# Patient Record
Sex: MALE | Race: WHITE | NOT HISPANIC OR LATINO | Employment: OTHER | URBAN - METROPOLITAN AREA
[De-identification: names, ages, dates, MRNs, and addresses within clinical notes are randomized per-mention and may not be internally consistent; named-entity substitution may affect disease eponyms.]

---

## 2021-03-04 ENCOUNTER — IMMUNIZATIONS (OUTPATIENT)
Dept: FAMILY MEDICINE CLINIC | Facility: HOSPITAL | Age: 66
End: 2021-03-04

## 2021-03-04 DIAGNOSIS — Z23 ENCOUNTER FOR IMMUNIZATION: Primary | ICD-10-CM

## 2021-03-04 PROCEDURE — 91301 SARS-COV-2 / COVID-19 MRNA VACCINE (MODERNA) 100 MCG: CPT

## 2021-03-04 PROCEDURE — 0011A SARS-COV-2 / COVID-19 MRNA VACCINE (MODERNA) 100 MCG: CPT

## 2021-04-02 ENCOUNTER — IMMUNIZATIONS (OUTPATIENT)
Dept: FAMILY MEDICINE CLINIC | Facility: HOSPITAL | Age: 66
End: 2021-04-02

## 2021-04-02 DIAGNOSIS — Z23 ENCOUNTER FOR IMMUNIZATION: Primary | ICD-10-CM

## 2021-04-02 PROCEDURE — 0012A SARS-COV-2 / COVID-19 MRNA VACCINE (MODERNA) 100 MCG: CPT

## 2021-04-02 PROCEDURE — 91301 SARS-COV-2 / COVID-19 MRNA VACCINE (MODERNA) 100 MCG: CPT

## 2021-04-10 ENCOUNTER — APPOINTMENT (OUTPATIENT)
Dept: LAB | Facility: HOSPITAL | Age: 66
End: 2021-04-10
Attending: INTERNAL MEDICINE
Payer: COMMERCIAL

## 2021-04-10 ENCOUNTER — TRANSCRIBE ORDERS (OUTPATIENT)
Dept: LAB | Facility: HOSPITAL | Age: 66
End: 2021-04-10

## 2021-04-10 DIAGNOSIS — E03.8 HYPOTHYROIDISM DUE TO FIBROUS INVASIVE THYROIDITIS: ICD-10-CM

## 2021-04-10 DIAGNOSIS — E78.2 MIXED HYPERLIPIDEMIA: ICD-10-CM

## 2021-04-10 DIAGNOSIS — G40.89 SOMATOSENSORY ATTACKS (HCC): ICD-10-CM

## 2021-04-10 DIAGNOSIS — F52.21 ERECTILE DYSFUNCTION OF NONORGANIC ORIGIN: ICD-10-CM

## 2021-04-10 DIAGNOSIS — I10 ESSENTIAL HYPERTENSION, MALIGNANT: ICD-10-CM

## 2021-04-10 DIAGNOSIS — E06.5 HYPOTHYROIDISM DUE TO FIBROUS INVASIVE THYROIDITIS: ICD-10-CM

## 2021-04-10 DIAGNOSIS — G40.89 SOMATOSENSORY ATTACKS (HCC): Primary | ICD-10-CM

## 2021-04-10 LAB
ALBUMIN SERPL BCP-MCNC: 4.5 G/DL (ref 3.4–4.8)
ALP SERPL-CCNC: 103.8 U/L (ref 10–129)
ALT SERPL W P-5'-P-CCNC: 17 U/L (ref 5–63)
ANION GAP SERPL CALCULATED.3IONS-SCNC: 7 MMOL/L (ref 4–13)
AST SERPL W P-5'-P-CCNC: 23 U/L (ref 15–41)
BASOPHILS # BLD AUTO: 0.05 THOUSANDS/ΜL (ref 0–0.1)
BASOPHILS NFR BLD AUTO: 1 % (ref 0–1)
BILIRUB SERPL-MCNC: 0.43 MG/DL (ref 0.3–1.2)
BUN SERPL-MCNC: 9 MG/DL (ref 6–20)
CALCIUM SERPL-MCNC: 10.5 MG/DL (ref 8.4–10.2)
CARBAMAZEPINE SERPL-MCNC: 6.8 UG/ML (ref 4–12)
CHLORIDE SERPL-SCNC: 99 MMOL/L (ref 96–108)
CHOLEST SERPL-MCNC: 189 MG/DL
CO2 SERPL-SCNC: 26 MMOL/L (ref 22–33)
CREAT SERPL-MCNC: 0.85 MG/DL (ref 0.5–1.2)
EOSINOPHIL # BLD AUTO: 0.15 THOUSAND/ΜL (ref 0–0.61)
EOSINOPHIL NFR BLD AUTO: 3 % (ref 0–6)
ERYTHROCYTE [DISTWIDTH] IN BLOOD BY AUTOMATED COUNT: 11.9 % (ref 11.6–15.1)
GFR SERPL CREATININE-BSD FRML MDRD: 92 ML/MIN/1.73SQ M
GLUCOSE P FAST SERPL-MCNC: 96 MG/DL (ref 70–105)
HCT VFR BLD AUTO: 43.5 % (ref 36.5–49.3)
HDLC SERPL-MCNC: 51 MG/DL
HGB BLD-MCNC: 14.6 G/DL (ref 12–17)
IMM GRANULOCYTES # BLD AUTO: 0.02 THOUSAND/UL (ref 0–0.2)
IMM GRANULOCYTES NFR BLD AUTO: 0 % (ref 0–2)
LDLC SERPL CALC-MCNC: 116 MG/DL (ref 0–100)
LYMPHOCYTES # BLD AUTO: 1.46 THOUSANDS/ΜL (ref 0.6–4.47)
LYMPHOCYTES NFR BLD AUTO: 27 % (ref 14–44)
MCH RBC QN AUTO: 30.7 PG (ref 26.8–34.3)
MCHC RBC AUTO-ENTMCNC: 33.6 G/DL (ref 31.4–37.4)
MCV RBC AUTO: 91 FL (ref 82–98)
MONOCYTES # BLD AUTO: 0.66 THOUSAND/ΜL (ref 0.17–1.22)
MONOCYTES NFR BLD AUTO: 12 % (ref 4–12)
NEUTROPHILS # BLD AUTO: 2.98 THOUSANDS/ΜL (ref 1.85–7.62)
NEUTS SEG NFR BLD AUTO: 57 % (ref 43–75)
NONHDLC SERPL-MCNC: 138 MG/DL
PLATELET # BLD AUTO: 338 THOUSANDS/UL (ref 149–390)
PMV BLD AUTO: 9.6 FL (ref 8.9–12.7)
POTASSIUM SERPL-SCNC: 4.7 MMOL/L (ref 3.5–5)
PROT SERPL-MCNC: 8.1 G/DL (ref 6.4–8.3)
PSA SERPL-MCNC: 1 NG/ML (ref 0–4)
RBC # BLD AUTO: 4.76 MILLION/UL (ref 3.88–5.62)
SODIUM SERPL-SCNC: 132 MMOL/L (ref 133–145)
TRIGL SERPL-MCNC: 111.7 MG/DL
TSH SERPL DL<=0.05 MIU/L-ACNC: 1.78 UIU/ML (ref 0.34–5.6)
WBC # BLD AUTO: 5.32 THOUSAND/UL (ref 4.31–10.16)

## 2021-04-10 PROCEDURE — 84153 ASSAY OF PSA TOTAL: CPT

## 2021-04-10 PROCEDURE — 85025 COMPLETE CBC W/AUTO DIFF WBC: CPT

## 2021-04-10 PROCEDURE — 84443 ASSAY THYROID STIM HORMONE: CPT

## 2021-04-10 PROCEDURE — 80061 LIPID PANEL: CPT

## 2021-04-10 PROCEDURE — 36415 COLL VENOUS BLD VENIPUNCTURE: CPT

## 2021-04-10 PROCEDURE — 80156 ASSAY CARBAMAZEPINE TOTAL: CPT

## 2021-04-10 PROCEDURE — 80053 COMPREHEN METABOLIC PANEL: CPT

## 2021-08-14 ENCOUNTER — APPOINTMENT (OUTPATIENT)
Dept: LAB | Facility: HOSPITAL | Age: 66
End: 2021-08-14
Attending: INTERNAL MEDICINE
Payer: COMMERCIAL

## 2021-08-14 DIAGNOSIS — G40.89 SOMATOSENSORY ATTACKS (HCC): ICD-10-CM

## 2021-08-14 DIAGNOSIS — F52.21 ERECTILE DYSFUNCTION OF NONORGANIC ORIGIN: ICD-10-CM

## 2021-08-14 DIAGNOSIS — I10 ESSENTIAL HYPERTENSION, MALIGNANT: ICD-10-CM

## 2021-08-14 DIAGNOSIS — E06.5 HYPOTHYROIDISM DUE TO FIBROUS INVASIVE THYROIDITIS: ICD-10-CM

## 2021-08-14 DIAGNOSIS — E78.2 MIXED HYPERLIPIDEMIA: ICD-10-CM

## 2021-08-14 DIAGNOSIS — E83.52 HYPERCALCEMIA: ICD-10-CM

## 2021-08-14 DIAGNOSIS — E03.8 HYPOTHYROIDISM DUE TO FIBROUS INVASIVE THYROIDITIS: ICD-10-CM

## 2021-08-14 LAB
ALBUMIN SERPL BCP-MCNC: 4.4 G/DL (ref 3.4–4.8)
ALP SERPL-CCNC: 85.1 U/L (ref 10–129)
ALT SERPL W P-5'-P-CCNC: 20 U/L (ref 5–63)
ANION GAP SERPL CALCULATED.3IONS-SCNC: 7 MMOL/L (ref 4–13)
AST SERPL W P-5'-P-CCNC: 23 U/L (ref 15–41)
BASOPHILS # BLD AUTO: 0.03 THOUSANDS/ΜL (ref 0–0.1)
BASOPHILS NFR BLD AUTO: 1 % (ref 0–1)
BILIRUB SERPL-MCNC: 0.61 MG/DL (ref 0.3–1.2)
BUN SERPL-MCNC: 11 MG/DL (ref 6–20)
CALCIUM SERPL-MCNC: 9.5 MG/DL (ref 8.4–10.2)
CHLORIDE SERPL-SCNC: 99 MMOL/L (ref 96–108)
CO2 SERPL-SCNC: 24 MMOL/L (ref 22–33)
CREAT SERPL-MCNC: 0.88 MG/DL (ref 0.5–1.2)
EOSINOPHIL # BLD AUTO: 0.13 THOUSAND/ΜL (ref 0–0.61)
EOSINOPHIL NFR BLD AUTO: 2 % (ref 0–6)
ERYTHROCYTE [DISTWIDTH] IN BLOOD BY AUTOMATED COUNT: 12.1 % (ref 11.6–15.1)
GFR SERPL CREATININE-BSD FRML MDRD: 90 ML/MIN/1.73SQ M
GLUCOSE P FAST SERPL-MCNC: 93 MG/DL (ref 70–105)
HCT VFR BLD AUTO: 42.2 % (ref 36.5–49.3)
HGB BLD-MCNC: 14.6 G/DL (ref 12–17)
IMM GRANULOCYTES # BLD AUTO: 0.03 THOUSAND/UL (ref 0–0.2)
IMM GRANULOCYTES NFR BLD AUTO: 1 % (ref 0–2)
LYMPHOCYTES # BLD AUTO: 1.55 THOUSANDS/ΜL (ref 0.6–4.47)
LYMPHOCYTES NFR BLD AUTO: 29 % (ref 14–44)
MCH RBC QN AUTO: 31.3 PG (ref 26.8–34.3)
MCHC RBC AUTO-ENTMCNC: 34.6 G/DL (ref 31.4–37.4)
MCV RBC AUTO: 91 FL (ref 82–98)
MONOCYTES # BLD AUTO: 0.66 THOUSAND/ΜL (ref 0.17–1.22)
MONOCYTES NFR BLD AUTO: 12 % (ref 4–12)
NEUTROPHILS # BLD AUTO: 2.99 THOUSANDS/ΜL (ref 1.85–7.62)
NEUTS SEG NFR BLD AUTO: 55 % (ref 43–75)
PLATELET # BLD AUTO: 335 THOUSANDS/UL (ref 149–390)
PMV BLD AUTO: 10.2 FL (ref 8.9–12.7)
POTASSIUM SERPL-SCNC: 4.4 MMOL/L (ref 3.5–5)
PROT SERPL-MCNC: 7.5 G/DL (ref 6.4–8.3)
PTH-INTACT SERPL-MCNC: 43.7 PG/ML (ref 18.4–80.1)
RBC # BLD AUTO: 4.66 MILLION/UL (ref 3.88–5.62)
SODIUM SERPL-SCNC: 130 MMOL/L (ref 133–145)
TSH SERPL DL<=0.05 MIU/L-ACNC: 3.59 UIU/ML (ref 0.34–5.6)
WBC # BLD AUTO: 5.39 THOUSAND/UL (ref 4.31–10.16)

## 2021-08-14 PROCEDURE — 83970 ASSAY OF PARATHORMONE: CPT

## 2021-08-14 PROCEDURE — 85025 COMPLETE CBC W/AUTO DIFF WBC: CPT

## 2021-08-14 PROCEDURE — 80053 COMPREHEN METABOLIC PANEL: CPT

## 2021-08-14 PROCEDURE — 84443 ASSAY THYROID STIM HORMONE: CPT

## 2021-08-14 PROCEDURE — 36415 COLL VENOUS BLD VENIPUNCTURE: CPT

## 2021-08-30 ENCOUNTER — HOSPITAL ENCOUNTER (OUTPATIENT)
Dept: RADIOLOGY | Facility: HOSPITAL | Age: 66
Discharge: HOME | End: 2021-08-30
Attending: INTERNAL MEDICINE
Payer: COMMERCIAL

## 2021-08-30 DIAGNOSIS — R09.89 RALES: ICD-10-CM

## 2021-08-30 PROCEDURE — 71046 X-RAY EXAM CHEST 2 VIEWS: CPT

## 2021-09-02 ENCOUNTER — TELEPHONE (OUTPATIENT)
Dept: NEUROLOGY | Facility: CLINIC | Age: 66
End: 2021-09-02

## 2021-09-02 NOTE — TELEPHONE ENCOUNTER
Patient called the [address] [place] [address] office to request a communication consent form be mailed

## 2021-09-02 NOTE — TELEPHONE ENCOUNTER
Spoke w wife Neetu Suarez -NOT ON COMM CONS - returuning "Samira's" call (corrected her to [facility])    Best contact number for patient:    Home number    Emergency Contact name and number:    Referring provider and telephone number:     PCP    Primary Care Provider Name and if affiliated with [facility]:       [address] PCP    Reason for Appointment/Dx:     SZ - med check    Have you seen and followed up with a pediatric Neurologist for this disease in the past?      NO (If yes ok to schedule with Dr Yudith Medina)    Neurology Location patient would like to be seen:     Lives in [place] - unable to verify w pt which location he prefers - he was at work  Order received? YES - in Media - scanned in 9/1/21                                                Records Received? PCP notes    Have you ever seen another Neurologist?       [facility] Name:     [facility]    ID/Policy #:    Secondary Insurance:    ID/Policy#: Workman's Comp/ Accident/ School  Information      Workman's Comp/Accident/School related? NO    If yes name of Insurance company:    Claim #:    Date of Injury:    Type of Injury:     Name and Telephone Number:    Notes:                   Appointment date: Wed 11/17/21  8a  Gladis Little    Told wife to have pt c/b to verify all appt info  Told her she is not on comm consent - so I cannot give info, can only take it from her  IS pt able to go into [facility] or [facility] or [facility] for possible sooner appt - didn't even look there

## 2021-11-12 ENCOUNTER — TELEPHONE (OUTPATIENT)
Dept: NEUROLOGY | Facility: CLINIC | Age: 66
End: 2021-11-12

## 2021-11-17 ENCOUNTER — CONSULT (OUTPATIENT)
Dept: NEUROLOGY | Facility: CLINIC | Age: 66
End: 2021-11-17
Payer: COMMERCIAL

## 2021-11-17 VITALS
DIASTOLIC BLOOD PRESSURE: 94 MMHG | HEART RATE: 81 BPM | SYSTOLIC BLOOD PRESSURE: 154 MMHG | HEIGHT: 73 IN | WEIGHT: 231 LBS | BODY MASS INDEX: 30.62 KG/M2 | TEMPERATURE: 97.2 F

## 2021-11-17 DIAGNOSIS — T42.75XA ANTICONVULSANT DRUG-INDUCED BONE SOFTENING: ICD-10-CM

## 2021-11-17 DIAGNOSIS — E87.1 HYPONATREMIA: ICD-10-CM

## 2021-11-17 DIAGNOSIS — M83.5 ANTICONVULSANT DRUG-INDUCED BONE SOFTENING: ICD-10-CM

## 2021-11-17 DIAGNOSIS — G40.209 LOCALIZATION-RELATED FOCAL EPILEPSY WITH COMPLEX PARTIAL SEIZURES (HCC): Primary | ICD-10-CM

## 2021-11-17 DIAGNOSIS — S06.9X9A TRAUMATIC BRAIN INJURY (HCC): ICD-10-CM

## 2021-11-17 PROBLEM — E78.49 OTHER HYPERLIPIDEMIA: Status: ACTIVE | Noted: 2021-11-17

## 2021-11-17 PROBLEM — I10 PRIMARY HYPERTENSION: Status: ACTIVE | Noted: 2021-11-17

## 2021-11-17 PROBLEM — S06.9XAA TRAUMATIC BRAIN INJURY: Status: ACTIVE | Noted: 2021-11-17

## 2021-11-17 PROBLEM — G40.909 SEIZURE DISORDER (HCC): Status: ACTIVE | Noted: 2021-11-17

## 2021-11-17 PROBLEM — E03.8 OTHER SPECIFIED HYPOTHYROIDISM: Status: ACTIVE | Noted: 2021-11-17

## 2021-11-17 PROCEDURE — 99205 OFFICE O/P NEW HI 60 MIN: CPT | Performed by: PSYCHIATRY & NEUROLOGY

## 2021-11-17 RX ORDER — LEVETIRACETAM 500 MG/1
TABLET ORAL
Qty: 60 TABLET | Refills: 5 | Status: SHIPPED | OUTPATIENT
Start: 2021-11-17 | End: 2022-02-16

## 2021-12-18 ENCOUNTER — APPOINTMENT (OUTPATIENT)
Dept: LAB | Facility: HOSPITAL | Age: 66
End: 2021-12-18
Attending: PSYCHIATRY & NEUROLOGY
Payer: COMMERCIAL

## 2021-12-18 DIAGNOSIS — T42.75XA ANTICONVULSANT DRUG-INDUCED BONE SOFTENING: ICD-10-CM

## 2021-12-18 DIAGNOSIS — E06.5 HYPOTHYROIDISM DUE TO FIBROUS INVASIVE THYROIDITIS: ICD-10-CM

## 2021-12-18 DIAGNOSIS — E03.8 HYPOTHYROIDISM DUE TO FIBROUS INVASIVE THYROIDITIS: ICD-10-CM

## 2021-12-18 DIAGNOSIS — I10 ESSENTIAL HYPERTENSION, MALIGNANT: ICD-10-CM

## 2021-12-18 DIAGNOSIS — G40.209 LOCALIZATION-RELATED FOCAL EPILEPSY WITH COMPLEX PARTIAL SEIZURES (HCC): ICD-10-CM

## 2021-12-18 DIAGNOSIS — E87.1 HYPOSMOLALITY SYNDROME: ICD-10-CM

## 2021-12-18 DIAGNOSIS — G40.89 SOMATOSENSORY ATTACKS (HCC): ICD-10-CM

## 2021-12-18 DIAGNOSIS — E83.52 HYPERCALCEMIA: ICD-10-CM

## 2021-12-18 DIAGNOSIS — J98.4 DISEASE OF LUNG: ICD-10-CM

## 2021-12-18 DIAGNOSIS — E78.2 MIXED HYPERLIPIDEMIA: ICD-10-CM

## 2021-12-18 DIAGNOSIS — M83.5 ANTICONVULSANT DRUG-INDUCED BONE SOFTENING: ICD-10-CM

## 2021-12-18 DIAGNOSIS — E87.1 HYPONATREMIA: ICD-10-CM

## 2021-12-18 LAB
25(OH)D3 SERPL-MCNC: 36.8 NG/ML (ref 30–100)
ALBUMIN SERPL BCP-MCNC: 4.5 G/DL (ref 3.4–4.8)
ALP SERPL-CCNC: 91.6 U/L (ref 10–129)
ALT SERPL W P-5'-P-CCNC: 18 U/L (ref 5–63)
ANION GAP SERPL CALCULATED.3IONS-SCNC: 7 MMOL/L (ref 4–13)
AST SERPL W P-5'-P-CCNC: 19 U/L (ref 15–41)
BASOPHILS # BLD AUTO: 0.04 THOUSANDS/ΜL (ref 0–0.1)
BASOPHILS NFR BLD AUTO: 1 % (ref 0–1)
BILIRUB SERPL-MCNC: 0.63 MG/DL (ref 0.3–1.2)
BUN SERPL-MCNC: 10 MG/DL (ref 6–20)
CALCIUM SERPL-MCNC: 9.5 MG/DL (ref 8.4–10.2)
CHLORIDE SERPL-SCNC: 98 MMOL/L (ref 96–108)
CO2 SERPL-SCNC: 25 MMOL/L (ref 22–33)
CREAT SERPL-MCNC: 0.92 MG/DL (ref 0.5–1.2)
EOSINOPHIL # BLD AUTO: 0.12 THOUSAND/ΜL (ref 0–0.61)
EOSINOPHIL NFR BLD AUTO: 2 % (ref 0–6)
ERYTHROCYTE [DISTWIDTH] IN BLOOD BY AUTOMATED COUNT: 11.9 % (ref 11.6–15.1)
GFR SERPL CREATININE-BSD FRML MDRD: 86 ML/MIN/1.73SQ M
GLUCOSE P FAST SERPL-MCNC: 94 MG/DL (ref 70–105)
HCT VFR BLD AUTO: 41.3 % (ref 36.5–49.3)
HGB BLD-MCNC: 14.4 G/DL (ref 12–17)
IMM GRANULOCYTES # BLD AUTO: 0.02 THOUSAND/UL (ref 0–0.2)
IMM GRANULOCYTES NFR BLD AUTO: 0 % (ref 0–2)
LYMPHOCYTES # BLD AUTO: 1.6 THOUSANDS/ΜL (ref 0.6–4.47)
LYMPHOCYTES NFR BLD AUTO: 31 % (ref 14–44)
MCH RBC QN AUTO: 31.9 PG (ref 26.8–34.3)
MCHC RBC AUTO-ENTMCNC: 34.9 G/DL (ref 31.4–37.4)
MCV RBC AUTO: 91 FL (ref 82–98)
MONOCYTES # BLD AUTO: 0.64 THOUSAND/ΜL (ref 0.17–1.22)
MONOCYTES NFR BLD AUTO: 12 % (ref 4–12)
NEUTROPHILS # BLD AUTO: 2.77 THOUSANDS/ΜL (ref 1.85–7.62)
NEUTS SEG NFR BLD AUTO: 54 % (ref 43–75)
NRBC BLD AUTO-RTO: 0 /100 WBCS
PLATELET # BLD AUTO: 318 THOUSANDS/UL (ref 149–390)
PMV BLD AUTO: 9.7 FL (ref 8.9–12.7)
POTASSIUM SERPL-SCNC: 4.2 MMOL/L (ref 3.5–5)
PROT SERPL-MCNC: 7.7 G/DL (ref 6.4–8.3)
RBC # BLD AUTO: 4.52 MILLION/UL (ref 3.88–5.62)
SODIUM SERPL-SCNC: 130 MMOL/L (ref 133–145)
TSH SERPL DL<=0.05 MIU/L-ACNC: 3.62 UIU/ML (ref 0.34–5.6)
WBC # BLD AUTO: 5.19 THOUSAND/UL (ref 4.31–10.16)

## 2021-12-18 PROCEDURE — 82306 VITAMIN D 25 HYDROXY: CPT

## 2021-12-18 PROCEDURE — 80177 DRUG SCRN QUAN LEVETIRACETAM: CPT

## 2021-12-18 PROCEDURE — 36415 COLL VENOUS BLD VENIPUNCTURE: CPT

## 2021-12-18 PROCEDURE — 84443 ASSAY THYROID STIM HORMONE: CPT

## 2021-12-18 PROCEDURE — 80053 COMPREHEN METABOLIC PANEL: CPT

## 2021-12-18 PROCEDURE — 85025 COMPLETE CBC W/AUTO DIFF WBC: CPT

## 2021-12-21 LAB — LEVETIRACETAM SERPL-MCNC: 2.7 UG/ML (ref 10–40)

## 2021-12-27 ENCOUNTER — HOSPITAL ENCOUNTER (OUTPATIENT)
Dept: RADIOLOGY | Facility: HOSPITAL | Age: 66
Discharge: HOME | End: 2021-12-27
Attending: PSYCHIATRY & NEUROLOGY
Payer: COMMERCIAL

## 2021-12-27 ENCOUNTER — IMMUNIZATIONS (OUTPATIENT)
Dept: FAMILY MEDICINE CLINIC | Facility: HOSPITAL | Age: 66
End: 2021-12-27
Payer: COMMERCIAL

## 2021-12-27 ENCOUNTER — HOSPITAL ENCOUNTER (OUTPATIENT)
Dept: NEUROLOGY | Facility: CLINIC | Age: 66
Discharge: HOME | End: 2021-12-27
Payer: COMMERCIAL

## 2021-12-27 DIAGNOSIS — S06.9X9A TRAUMATIC BRAIN INJURY (HCC): ICD-10-CM

## 2021-12-27 DIAGNOSIS — G40.209 LOCALIZATION-RELATED FOCAL EPILEPSY WITH COMPLEX PARTIAL SEIZURES (HCC): ICD-10-CM

## 2021-12-27 DIAGNOSIS — Z23 ENCOUNTER FOR IMMUNIZATION: Primary | ICD-10-CM

## 2021-12-27 DIAGNOSIS — M83.5 ANTICONVULSANT DRUG-INDUCED BONE SOFTENING: ICD-10-CM

## 2021-12-27 DIAGNOSIS — T42.75XA ANTICONVULSANT DRUG-INDUCED BONE SOFTENING: ICD-10-CM

## 2021-12-27 DIAGNOSIS — G40.209 LOCALIZATION-RELATED FOCAL EPILEPSY WITH COMPLEX PARTIAL SEIZURES (HCC): Primary | ICD-10-CM

## 2021-12-27 PROCEDURE — 0064A COVID-19 MODERNA VACC 0.25 ML BOOSTER: CPT

## 2021-12-27 PROCEDURE — 95816 EEG AWAKE AND DROWSY: CPT | Performed by: PSYCHIATRY & NEUROLOGY

## 2021-12-27 PROCEDURE — 77080 DXA BONE DENSITY AXIAL: CPT

## 2021-12-27 PROCEDURE — 91306 COVID-19 MODERNA VACC 0.25 ML BOOSTER: CPT

## 2021-12-27 PROCEDURE — 95816 EEG AWAKE AND DROWSY: CPT

## 2021-12-27 RX ORDER — LEVETIRACETAM 750 MG/1
750 TABLET ORAL 2 TIMES DAILY
Qty: 60 TABLET | Refills: 5 | Status: SHIPPED | OUTPATIENT
Start: 2021-12-27 | End: 2022-02-16

## 2022-01-04 ENCOUNTER — TELEPHONE (OUTPATIENT)
Dept: NEUROLOGY | Facility: CLINIC | Age: 67
End: 2022-01-04

## 2022-01-04 NOTE — TELEPHONE ENCOUNTER
Spoke to Destiny  Informed of previous  Verbalized understanding  Will also follow with pcp for further instructions

## 2022-01-04 NOTE — TELEPHONE ENCOUNTER
----- Message from Emmy García MD sent at 1/3/2022  4:02 PM EST -----  DEXA scan shows osteoporosis, which can be in part due to carbamazepine's effect as a hepatic enzyme inducer and bone demineralization  He should eat foods enriched with calcium and vitamin D  If he is not getting enough calcium from diet, can supplement with calcium 500mg and vitamin D 200-600 units twice a day or a multivitamin with calcium/vitamin D  He should follow-up with PCP regarding other options to manage osteoporosis

## 2022-01-29 ENCOUNTER — APPOINTMENT (OUTPATIENT)
Dept: LAB | Facility: HOSPITAL | Age: 67
End: 2022-01-29
Attending: PSYCHIATRY & NEUROLOGY
Payer: COMMERCIAL

## 2022-01-29 DIAGNOSIS — G40.209 LOCALIZATION-RELATED FOCAL EPILEPSY WITH COMPLEX PARTIAL SEIZURES (HCC): ICD-10-CM

## 2022-01-29 PROCEDURE — 36415 COLL VENOUS BLD VENIPUNCTURE: CPT

## 2022-01-29 PROCEDURE — 80177 DRUG SCRN QUAN LEVETIRACETAM: CPT

## 2022-02-02 ENCOUNTER — TELEPHONE (OUTPATIENT)
Dept: NEUROLOGY | Facility: CLINIC | Age: 67
End: 2022-02-02

## 2022-02-02 LAB — LEVETIRACETAM SERPL-MCNC: 5.1 UG/ML (ref 10–40)

## 2022-02-02 NOTE — TELEPHONE ENCOUNTER
----- Message from Ranjan Keyes MD sent at 2/2/2022 10:51 AM EST -----  Please confirm medication compliance  Levetiracetam level is still low  He should be on Levetiracetam 750mg twice a day; any side effects at this point  I would like to see levetiracetam level >10 before weaning off of carbamazepine

## 2022-02-02 NOTE — TELEPHONE ENCOUNTER
Spoke to wife Shemar Course  Patient is compliant with medications  Confirmed no missed doses  Currently taking 750mg BID  Doing well with medication, no side effects  Reports patient actually "feels better" with this new medication  F/U 2/16  Will continue as is unless further recommendations

## 2022-02-16 ENCOUNTER — OFFICE VISIT (OUTPATIENT)
Dept: NEUROLOGY | Facility: CLINIC | Age: 67
End: 2022-02-16
Payer: COMMERCIAL

## 2022-02-16 VITALS
DIASTOLIC BLOOD PRESSURE: 89 MMHG | BODY MASS INDEX: 30.62 KG/M2 | SYSTOLIC BLOOD PRESSURE: 151 MMHG | HEIGHT: 73 IN | WEIGHT: 231 LBS | HEART RATE: 82 BPM

## 2022-02-16 DIAGNOSIS — T42.75XA ANTICONVULSANT DRUG-INDUCED BONE SOFTENING: ICD-10-CM

## 2022-02-16 DIAGNOSIS — G40.209 LOCALIZATION-RELATED FOCAL EPILEPSY WITH COMPLEX PARTIAL SEIZURES (HCC): Primary | ICD-10-CM

## 2022-02-16 DIAGNOSIS — M83.5 ANTICONVULSANT DRUG-INDUCED BONE SOFTENING: ICD-10-CM

## 2022-02-16 DIAGNOSIS — E87.1 HYPONATREMIA: ICD-10-CM

## 2022-02-16 PROCEDURE — 99214 OFFICE O/P EST MOD 30 MIN: CPT | Performed by: PSYCHIATRY & NEUROLOGY

## 2022-02-16 RX ORDER — LEVETIRACETAM 1000 MG/1
1000 TABLET ORAL 2 TIMES DAILY
Qty: 60 TABLET | Refills: 5 | Status: SHIPPED | OUTPATIENT
Start: 2022-02-16 | End: 2022-02-16 | Stop reason: SDUPTHER

## 2022-02-16 RX ORDER — LEVETIRACETAM 1000 MG/1
1000 TABLET ORAL 2 TIMES DAILY
Qty: 60 TABLET | Refills: 5 | Status: SHIPPED | OUTPATIENT
Start: 2022-02-16 | End: 2022-05-25 | Stop reason: SDUPTHER

## 2022-02-16 NOTE — PATIENT INSTRUCTIONS
Plan:   1 - Finish off Levetiracetam 750mg twice a day until you have no more 750mg tabs, the next prescription is for 1000mg tab take one tab twice a day  2 - Continue with carbamazepine 200mg tab take one tab twice a day until you start Levetiracetam 1000mg tabs, then cut the carbamazepine 200mg tab to half a tab twice a day for 14 days  3 - after being on the higher dose of Levetiracetam for about a month go for blood work to check levetiracetam level, vitamin D level, and BMP  4 - follow-up in 3 months    If you have a breakthrough seizure causing a convulsion call 911 for immediate assistance and evaluation at the nearest emergency department  I discussed seizure safety and seizure first aid with the patient  Limits would be no unprotected heights (ladders, standing on chairs/tables), should not swim alone (need partners or ), cooking with a partner to avoid burn injuries, showers instead of baths  I reviewed that convulsive seizures typically last about 2 minutes with about 15-30 minutes of recovery  Should a seizure last more than 5 minutes or patient fails to recover after 30 minutes or there are multiple seizures in a day or if there is a suspected head injury then EMS should be called or they go to the nearest emergency room  If he only experiences altered awareness, confusion, or focal seizures, then they may call the office for guidance  Seizure first aid consists of preventing the patient from wandering or removal of dangerous objects or prevention of injury, for convulsive seizures, position the patient on the ground, may place a pillow under the head, turn the patient to his side, no objects or reaching for the mouth, no restraints but prevent injuries by removing glasses or sharp/heavy objects, and time the duration of the seizure  I recommend that he obtain a medical alert bracelet that states "Seizure disorder" or "Epilepsy" along with any medication allergies      We discussed issues related to driving  I explained to the patient that the law states that all patients who have a seizure must be reported to the [place]  Patients cannot legally drive for 6 months after seizure in the state of [place] (6 months in the state of [place] and 3 months in the state of [place] )  He is not cleared to return to driving until he has been without a seizure for at least 6 months and cleared by the appropriate state DMV/DOT  I also informed the patient that, although exceptions can be granted for patients with provoked seizures, exclusively nocturnal seizures, prolonged auras, or exclusively simple partial seizures, these exceptions are granted by the [place] and not by the physician

## 2022-02-16 NOTE — PROGRESS NOTES
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 that happened after he sustained multifocal traumatic brain injuries in  after a fall from a significant height  His MRI brain study from  showed the sequela of right more than left frontal and temporal lobe encephalomalacia and gliosis and other posterior gyri injuries  He has been on carbamazepine for more than two decades without recurrent seizures  However, carbamazepine is causing hyponatremia and osteoporosis  Due to the chronic side effects of carbamazepine, we decided to transition him to levetiracetam, which is not associated with these side effects  He has done well with the addition of levetiracetam and slight dose reduction of carbamazepine  I recommend that we increase his dose of levetiracetam due to levels below the reference range  We discussed that there is a risk of recurrent seizure with medication transition but the risk is low and reasonable given the need to transition off of carbamazepine  Plan:   1 - Finish off Levetiracetam 750mg twice a day until you have no more 750mg tabs, the next prescription is for 1000mg tab take one tab twice a day  2 - Continue with carbamazepine 200mg tab take one tab twice a day until you start Levetiracetam 1000mg tabs, then cut the carbamazepine 200mg tab to half a tab twice a day for 14 days    3 - after being on the higher dose of Levetiracetam for about a month go for blood work to check levetiracetam level, vitamin D level, and BMP  4 - follow-up in 3 months    Problem List Items Addressed This Visit        Nervous and Auditory    Localization-related focal epilepsy with complex partial seizures ([address] ) - Primary    Relevant Medications    levETIRAcetam (Keppra) 1000 MG tablet    Other Relevant Orders    Basic metabolic panel    Levetiracetam level       Musculoskeletal and Integument    Anticonvulsant drug-induced bone softening    Relevant Orders    Vitamin D 25 hydroxy       Other    Hyponatremia    Relevant Orders    Basic metabolic panel          Chief Complaint:    Chief Complaint   Patient presents with    Seizures    Neurologic Problem     medication side effects      HPI:    Grace Ochoa is a 73 y o  right handed male here for follow-up evaluation of seizure disorder and medication management  Interval History 2/16/2022  He started levetiracetam titrated to 750-750  He also reduced carbamazepine down to 200mg twice a day  He has also started calcium and vitamin D supplementation  There has been no recurrent seizure since 1996  His mood and balance has been good  He feels sharper  AED/side effects/compliance:  Carbamazepine 200-200  Levetiracetam 750-750    Event/Seizure semiology:  1  Unknown seizure type    Prior Epilepsy History:  Intake History 11/17/2021  Records from Dr Margot Andrews are limited with respect to epilepsy diagnosis  Reference to a head injury in 1996 resulting in seizures  Mr Dylan Thompson recalled that 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  He has no recollection of what happened, no memory of the accident  He believes that he was in [facility], "not with it"  He ended up in [place] [address]  He may have been in a coma  He may have hit the back of his head on the concrete curve, there was a fracture and there was an injury (area of "dead brain")  It was about 6 months later he had an episode  He recalled he was at work, then woke up in the hospital   He had no recollection what happened  His wife recalled that Tatiana Tobar was being taken to the hospital, but no information was provided to him  He does not what was the diagnosis    He went to see a neurologist (but does not remember the name of the doctor)  He had a 24 hours ambulatory EEG study  He was put on carbamazepine 200-200, to be on the safe side, so that he would never have another episode  He was never given a formal diagnosis  He has been seen by Dr Kalyn Blake ever since this episode happened  He has not seen a neurologist for years  The patient denies any history of myoclonus, staring spells, automatisms, unexplained hyperkinetic behaviors, olfactory / gustatory hallucinations, epigastric rising events, susan vu events, visual hallucinations, unexplained nocturnal enuresis, or nocturnal tongue biting  Special Features  Status epilepticus: No  Self Injury Seizures: No  Precipitating Factors: None  Post-ictal state: None    Epilepsy Risk Factors:  Abnormal pregnancy: No  Abnormal birth/: No  Abnormal Development: No  Febrile seizures, simple: No  Febrile seizures, complex: No  CNS infection: No  Mental retardation: No  Cerebral palsy: No  Head injury (moderate/severe): Yes -  fell from a ladder with severe head injury, coma  CNS neoplasm: No  CNS malformation: No  Neurosurgical procedure: No  Stroke: No  CNS autoimmune disorder: No  Alcohol abuse: No  Drug abuse: No  Family history Sz/epilepsy: No    Prior AEDs:  medication Time used Reason to stop   carbamazepine ?-now hyponatremia   levetiracetam -now      Prior workup:  x  Imagin2015 - MRI brain study  There are multifocal regions of encephalomalacia and gliosis  The most significant ones are right more than left anterior frontal lobe involvement (quite extensive T2 hyperintensity on the right extending down the white matter to the frontal horn of the lateral ventricle), right more than left anterior temporal pole, and specific gyri in the bilateral posterior lateral frontal lobes (anterior to the precentral gyrus), and one gyrus on the left parietal region      2021 - DEXA  Consistent with osteoporosis  T score -2 2 (right and left femoral neck)  T score -2 7 (lumbar spine)    EEGs:  12/27/2021  Normal awake study    Labs:  Component      Latest Ref Rng & Units 12/18/2021 1/29/2022   WBC      4 31 - 10 16 Thousand/uL 5 19    Red Blood Cell Count      3 88 - 5 62 Million/uL 4 52    Hemoglobin      12 0 - 17 0 g/dL 14 4    HCT      36 5 - 49 3 % 41 3    Platelet Count      310 - 390 Thousands/uL 318    Sodium      133 - 145 mmol/L 130 (L)    Potassium      3 5 - 5 0 mmol/L 4 2    Chloride      96 - 108 mmol/L 98    CO2      22 - 33 mmol/L 25    Anion Gap      4 - 13 mmol/L 7    BUN      6 - 20 mg/dL 10    Creatinine      0 50 - 1 20 mg/dL 0 92    GLUCOSE FASTING      70 - 105 mg/dL 94    Calcium      8 4 - 10 2 mg/dL 9 5    AST      15 - 41 U/L 19    ALT      5 - 63 U/L 18    Alkaline Phosphatase      10 - 129 U/L 91 6    Total Protein      6 4 - 8 3 g/dL 7 7    Albumin      3 4 - 4 8 g/dL 4 5    TOTAL BILIRUBIN      0 30 - 1 20 mg/dL 0 63    eGFR      ml/min/1 73sq m 86    LEVETIRACETA (KEPPRA)      10 0 - 40 0 ug/mL 2 7 (L) 5 1 (L)   Vit D, 25-Hydroxy      30 0 - 100 0 ng/mL 36 8      General exam   /89 (BP Location: Right arm, Patient Position: Sitting, Cuff Size: Standard)   Pulse 82   Ht 6' 1" (1 854 m)   Wt 105 kg (231 lb)   BMI 30 48 kg/m²    Appearance: normally developed, appears well  Carotids: not assessed  Cardiovascular: regular rate and rhythm and normal heart sounds  Pulmonary: clear to auscultation    HEENT: anicteric and neck is supple   Fundoscopy: not assessed    Mental status  Orientation: alert and oriented to name, place, time  Fund of Knowledge: intact   Attention and Concentration: intact  Current and Remote Memory:intact  Language: spontaneous speech is normal and comprehension is intact    Cranial Nerves  CN 1: not tested  CN 2: pupils equal round reactive to direct and consenual light   CN 3, 4, 6: EOMI, no nystagmus  CN 5:sensation intact to all distribution V1, V2, V3  CN 7:not assessed  CN 8:not assessed  CN 9, 10:no dysarthria present  CN 11:not assessed  CN 12:not assessed    Motor:  Bulk, Tone: normal bulk, normal tone  Pronation: no pronator drift  Strength: Symmetric strength of the arms and legs, no lateralizing weakness   Abnormal movements: no abnormal movements are present    Sensory:  Lighttouch: intact in all limbs  Romberg:not assessed    Coordination:  FNF:FNF bilaterally intact  IKE:not assessed  FFM:intact  Gait/Station:normal gait    Reflexes:  Not assessed    Past Medical/Surgical History:  Patient Active Problem List   Diagnosis    Primary hypertension    Other specified hypothyroidism    Other hyperlipidemia    Localization-related focal epilepsy with complex partial seizures ([facility] )    Hyponatremia    Traumatic brain injury ([facility] )    Anticonvulsant drug-induced bone softening     Past Surgical History:   Procedure Laterality Date    ROTATOR CUFF REPAIR Bilateral     TONSILLECTOMY       Past Psychiatric History:  Depression: No  Anxiety: No  Psychosis: No    Medications:    Current Outpatient Medications:     amLODIPine-benazepril (LOTREL 5-10) 5-10 MG per capsule, Take 1 capsule by mouth daily, Disp: , Rfl:     Calcium Carbonate-Vitamin D 600-400 MG-UNIT per tablet, Take 1 tablet by mouth 2 (two) times a day, Disp: , Rfl:     levETIRAcetam (Keppra) 1000 MG tablet, Take 1 tablet (1,000 mg total) by mouth 2 (two) times a day, Disp: 60 tablet, Rfl: 5    levothyroxine 200 mcg tablet, Take 200 mcg by mouth daily, Disp: , Rfl:     lisinopril (ZESTRIL) 20 mg tablet, Take 20 mg by mouth daily, Disp: , Rfl:     pravastatin (PRAVACHOL) 80 mg tablet, Take 80 mg by mouth daily, Disp: , Rfl:     Allergies:  No Known Allergies    Family history:  Family History   Problem Relation Age of Onset    Diabetes Father      There is no family history of seizure, epilepsy or developmental delay        Social History  Living situation:  Lives with spouse  Work:  Cable construction, 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); he is approaching half-way; completed high school  Driving:  Yes, [place] 's license   reports that he has quit smoking  He has never used smokeless tobacco  He reports previous alcohol use  He reports that he does not use drugs  Review of Systems  A review of at least 12 organ/systems was obtained by the medical assistant and reviewed by me, including additional positives/negatives:  A review of at least 12 organ/systems was evaluated and there are no complaints  Decision making was of high-complexity due to the patient's high risk condition (seizures), psychiatric and neuropsychological comorbidities, behavioral problems, memory and cognitive problems and medication side effects

## 2022-04-22 ENCOUNTER — APPOINTMENT (OUTPATIENT)
Dept: LAB | Facility: HOSPITAL | Age: 67
End: 2022-04-22
Attending: INTERNAL MEDICINE
Payer: COMMERCIAL

## 2022-04-22 DIAGNOSIS — E87.1 HYPOSMOLALITY SYNDROME: ICD-10-CM

## 2022-04-22 DIAGNOSIS — I10 HYPERTENSION, ESSENTIAL: ICD-10-CM

## 2022-04-22 DIAGNOSIS — E03.8 TOXIC DIFFUSE GOITER WITH PRETIBIAL MYXEDEMA: ICD-10-CM

## 2022-04-22 DIAGNOSIS — E05.00 TOXIC DIFFUSE GOITER WITH PRETIBIAL MYXEDEMA: ICD-10-CM

## 2022-04-22 DIAGNOSIS — E78.49 FAMILIAL COMBINED HYPERLIPIDEMIA: ICD-10-CM

## 2022-04-22 DIAGNOSIS — G40.89 SOMATOSENSORY ATTACKS (HCC): ICD-10-CM

## 2022-04-22 LAB
ALBUMIN SERPL BCP-MCNC: 4.5 G/DL (ref 3.5–5)
ALP SERPL-CCNC: 67 U/L (ref 34–104)
ALT SERPL W P-5'-P-CCNC: 32 U/L (ref 7–52)
ANION GAP SERPL CALCULATED.3IONS-SCNC: 6 MMOL/L (ref 4–13)
AST SERPL W P-5'-P-CCNC: 28 U/L (ref 13–39)
BASOPHILS # BLD AUTO: 0.05 THOUSANDS/ΜL (ref 0–0.1)
BASOPHILS NFR BLD AUTO: 1 % (ref 0–1)
BILIRUB SERPL-MCNC: 0.68 MG/DL (ref 0.2–1)
BUN SERPL-MCNC: 14 MG/DL (ref 5–25)
CALCIUM SERPL-MCNC: 9.9 MG/DL (ref 8.4–10.2)
CARBAMAZEPINE SERPL-MCNC: <0.5 UG/ML (ref 4–12)
CHLORIDE SERPL-SCNC: 104 MMOL/L (ref 96–108)
CHOLEST SERPL-MCNC: 143 MG/DL
CO2 SERPL-SCNC: 26 MMOL/L (ref 21–32)
CREAT SERPL-MCNC: 0.96 MG/DL (ref 0.6–1.3)
EOSINOPHIL # BLD AUTO: 0.15 THOUSAND/ΜL (ref 0–0.61)
EOSINOPHIL NFR BLD AUTO: 3 % (ref 0–6)
ERYTHROCYTE [DISTWIDTH] IN BLOOD BY AUTOMATED COUNT: 12 % (ref 11.6–15.1)
GFR SERPL CREATININE-BSD FRML MDRD: 82 ML/MIN/1.73SQ M
GLUCOSE P FAST SERPL-MCNC: 91 MG/DL (ref 65–99)
HCT VFR BLD AUTO: 44 % (ref 36.5–49.3)
HDLC SERPL-MCNC: 43 MG/DL
HGB BLD-MCNC: 15.3 G/DL (ref 12–17)
IMM GRANULOCYTES # BLD AUTO: 0.03 THOUSAND/UL (ref 0–0.2)
IMM GRANULOCYTES NFR BLD AUTO: 1 % (ref 0–2)
LDLC SERPL CALC-MCNC: 73 MG/DL (ref 0–100)
LYMPHOCYTES # BLD AUTO: 1.66 THOUSANDS/ΜL (ref 0.6–4.47)
LYMPHOCYTES NFR BLD AUTO: 28 % (ref 14–44)
MCH RBC QN AUTO: 32.4 PG (ref 26.8–34.3)
MCHC RBC AUTO-ENTMCNC: 34.8 G/DL (ref 31.4–37.4)
MCV RBC AUTO: 93 FL (ref 82–98)
MONOCYTES # BLD AUTO: 0.67 THOUSAND/ΜL (ref 0.17–1.22)
MONOCYTES NFR BLD AUTO: 11 % (ref 4–12)
NEUTROPHILS # BLD AUTO: 3.47 THOUSANDS/ΜL (ref 1.85–7.62)
NEUTS SEG NFR BLD AUTO: 56 % (ref 43–75)
NONHDLC SERPL-MCNC: 100 MG/DL
NRBC BLD AUTO-RTO: 0 /100 WBCS
PLATELET # BLD AUTO: 294 THOUSANDS/UL (ref 149–390)
PMV BLD AUTO: 10.3 FL (ref 8.9–12.7)
POTASSIUM SERPL-SCNC: 4 MMOL/L (ref 3.5–5.3)
PROT SERPL-MCNC: 8.1 G/DL (ref 6.4–8.4)
RBC # BLD AUTO: 4.72 MILLION/UL (ref 3.88–5.62)
SODIUM SERPL-SCNC: 136 MMOL/L (ref 135–147)
TRIGL SERPL-MCNC: 134 MG/DL
TSH SERPL DL<=0.05 MIU/L-ACNC: 3.74 UIU/ML (ref 0.45–4.5)
WBC # BLD AUTO: 6.03 THOUSAND/UL (ref 4.31–10.16)

## 2022-04-22 PROCEDURE — 80053 COMPREHEN METABOLIC PANEL: CPT

## 2022-04-22 PROCEDURE — 84443 ASSAY THYROID STIM HORMONE: CPT

## 2022-04-22 PROCEDURE — 80061 LIPID PANEL: CPT

## 2022-04-22 PROCEDURE — 85025 COMPLETE CBC W/AUTO DIFF WBC: CPT

## 2022-04-22 PROCEDURE — 80156 ASSAY CARBAMAZEPINE TOTAL: CPT

## 2022-04-22 PROCEDURE — 36415 COLL VENOUS BLD VENIPUNCTURE: CPT

## 2022-04-29 ENCOUNTER — APPOINTMENT (OUTPATIENT)
Dept: LAB | Facility: HOSPITAL | Age: 67
End: 2022-04-29
Attending: PSYCHIATRY & NEUROLOGY
Payer: COMMERCIAL

## 2022-04-29 DIAGNOSIS — M83.5 ANTICONVULSANT DRUG-INDUCED BONE SOFTENING: ICD-10-CM

## 2022-04-29 DIAGNOSIS — G40.209 LOCALIZATION-RELATED FOCAL EPILEPSY WITH COMPLEX PARTIAL SEIZURES (HCC): ICD-10-CM

## 2022-04-29 DIAGNOSIS — E87.1 HYPONATREMIA: ICD-10-CM

## 2022-04-29 DIAGNOSIS — T42.75XA ANTICONVULSANT DRUG-INDUCED BONE SOFTENING: ICD-10-CM

## 2022-04-29 LAB
25(OH)D3 SERPL-MCNC: 60.7 NG/ML (ref 30–100)
ANION GAP SERPL CALCULATED.3IONS-SCNC: 6 MMOL/L (ref 4–13)
BUN SERPL-MCNC: 12 MG/DL (ref 5–25)
CALCIUM SERPL-MCNC: 10 MG/DL (ref 8.4–10.2)
CHLORIDE SERPL-SCNC: 103 MMOL/L (ref 96–108)
CO2 SERPL-SCNC: 28 MMOL/L (ref 21–32)
CREAT SERPL-MCNC: 1.07 MG/DL (ref 0.6–1.3)
GFR SERPL CREATININE-BSD FRML MDRD: 71 ML/MIN/1.73SQ M
GLUCOSE P FAST SERPL-MCNC: 98 MG/DL (ref 65–99)
POTASSIUM SERPL-SCNC: 4.7 MMOL/L (ref 3.5–5.3)
SODIUM SERPL-SCNC: 137 MMOL/L (ref 135–147)

## 2022-04-29 PROCEDURE — 80048 BASIC METABOLIC PNL TOTAL CA: CPT

## 2022-04-29 PROCEDURE — 36415 COLL VENOUS BLD VENIPUNCTURE: CPT

## 2022-04-29 PROCEDURE — 80177 DRUG SCRN QUAN LEVETIRACETAM: CPT

## 2022-04-29 PROCEDURE — 82306 VITAMIN D 25 HYDROXY: CPT

## 2022-05-03 LAB — LEVETIRACETAM SERPL-MCNC: 9.1 UG/ML (ref 10–40)

## 2022-05-04 ENCOUNTER — TELEPHONE (OUTPATIENT)
Dept: NEUROLOGY | Facility: CLINIC | Age: 67
End: 2022-05-04

## 2022-05-04 NOTE — TELEPHONE ENCOUNTER
----- Message from Luis Enrique Moseley MD sent at 5/3/2022  4:31 PM EDT -----  Levetiracetam level is still relatively low 9 1mcg/mL  Last seen in 2/16/2022 - recommended increasing levetiracetam to 1000mg twice a day  Please confirm if he is taking medication as prescribed  Any missed doses? Has he weaned off of carbamazepine? Any recurrent seizure? Vitamin D level is good, may discontinue vitamin D supplementation if he is off of carbamazepine      Sodium level is normal

## 2022-05-11 ENCOUNTER — TELEPHONE (OUTPATIENT)
Dept: NEUROLOGY | Facility: CLINIC | Age: 67
End: 2022-05-11

## 2022-05-25 ENCOUNTER — OFFICE VISIT (OUTPATIENT)
Dept: NEUROLOGY | Facility: CLINIC | Age: 67
End: 2022-05-25
Payer: COMMERCIAL

## 2022-05-25 VITALS
SYSTOLIC BLOOD PRESSURE: 120 MMHG | WEIGHT: 224 LBS | BODY MASS INDEX: 29.69 KG/M2 | HEART RATE: 69 BPM | DIASTOLIC BLOOD PRESSURE: 80 MMHG | HEIGHT: 73 IN

## 2022-05-25 DIAGNOSIS — G40.209 LOCALIZATION-RELATED FOCAL EPILEPSY WITH COMPLEX PARTIAL SEIZURES (HCC): Primary | ICD-10-CM

## 2022-05-25 PROCEDURE — 99213 OFFICE O/P EST LOW 20 MIN: CPT | Performed by: PSYCHIATRY & NEUROLOGY

## 2022-05-25 RX ORDER — ROSUVASTATIN CALCIUM 20 MG/1
20 TABLET, COATED ORAL DAILY
COMMUNITY

## 2022-05-25 RX ORDER — ERGOCALCIFEROL 1.25 MG/1
50000 CAPSULE ORAL DAILY
COMMUNITY

## 2022-05-25 RX ORDER — AMLODIPINE BESYLATE 5 MG/1
5 TABLET ORAL DAILY
COMMUNITY

## 2022-05-25 RX ORDER — LEVETIRACETAM 1000 MG/1
1000 TABLET ORAL 2 TIMES DAILY
Qty: 180 TABLET | Refills: 3 | Status: SHIPPED | OUTPATIENT
Start: 2022-05-25 | End: 2022-07-25 | Stop reason: SDUPTHER

## 2022-05-25 NOTE — PATIENT INSTRUCTIONS
Plan:   1 - continue with Levetiracetam 1000mg twice a day  2 - you should continue with Caltrate one tab twice a day  3 - you can finish off the vitamin D 5000 unit one cap daily, no need to refill extra vitamin D supplementation now that you are no longer on carbamazepine  You should continue to follow-up your primary care doctor managing vitamin D  4 - follow-up in 6 months    Call the office if you experience an episode lasting less than 5 minutes of confusion, disorientation, unresponsiveness, loss speech, or staring spell  If any neurological deficit or change last longer than 5 minutes, or he has a convulsion, call 911/EMS and go to the nearest hospital for evaluation of stroke or seizure  FIRST AID FOR SEIZURES    What to Do If You Witness a Seizure:     Generalized convulsive (called a generalized tonic-clonic or grand mal seizure)  During this seizure, the person may cry out, suddenly stiffen up, make jerking movements, and fall  The person may turn pale or blue from difficulty breathing  Actions:  Stay calm  Talk in a soothing voice and if possible keep onlookers away  Prevent injury  Move objects away that the person might hit while jerking uncontrollably  Time when the seizure starts and ends  Most seizures stop after only a few minutes  Turn him or her gently onto one side  This will help keep the airway clear  Never place anything in his/her mouth or give him/her anything by mouth during a seizure  -- Do not give the person water, pills, or food until fully alert  Loosen tight clothing or jewelry around his/her neck  Make the person as comfortable as possible  Place something soft under their head  Do not hold the person down  If the person having a seizure thrashes around there is no need for you to restrain them  They are more likely to be combative if restrained  Remember to consider your safety as well  Keep onlookers away     Be sensitive and supportive, and ask others to do the same  Stay with the person until he/she is fully alert  Complex partial seizure (confusional spells)  During this kind of seizure, the person may have a glassy stare; give no response or inappropriate responses when questioned; sit, stand, or walk about aimlessly; make lip smacking or chewing motions; fidget with clothes; appear to be drunk, drugged, or confused  Actions:  Make sure the person is safe and wont harm themselves  Try to remove harmful objects from around the person (tools, utensils, glasses)  Do NOT be aggressive or attempt to restrain the person  They are more likely to be combative if restrained  Remember to consider your safety as well  Help prevent the person from wandering, and direct the person to chair or safe position  Never place anything in his/her mouth or give him/her anything by mouth during a seizure  -- Do not give the person water, pills, or food until fully alert  Keep onlookers away  Be sensitive and supportive, and ask others to do the same  Stay with the person until he/she is fully alert  CALL 911 if:  A convulsive seizure lasts more than 5 minutes  The person turns blue during the seizure  The person does not start breathing after the seizure  Begin mouth to mouth resuscitation if this would occur  The person has one seizure right after the other without coming back to normal consciousness between the seizures  The person has not regained consciousness or is still confused after 30 minutes  You know the person does not have epilepsy  You know the person has diabetes or low blood sugar  The person is pregnant, ill, or injured  The seizure occurred in water, because the person may have inhaled water  The person requests an ambulance or medical help       Rescue medication  Your doctor may prescribe a rescue medication such as lorazepam (Ativan), diazepam (Valium / Diastat), or clonazepam (Klonopin) to terminate a seizure or if you have a history of cluster of seizures  Follow the instructions given by your doctor for these medications    Recognizing Common Seizures (examples)   Simple partial seizures: Isolated twitching, numbness, sweating, dizziness, nausea/vomiting, disturbances to hearing, vision, smell or taste  No loss of consciousness occurs, and the person remains aware of his/her environment  Complex partial seizures: Staring, motionless, picking at clothes, smacking lips, swallowing repeatedly or wandering around  The person is not aware of their surroundings and is not fully responsive  Atonic seizures: Drop attacks or sudden, rapid fall to ground with rapid recovery  Myoclonic seizures: Brief forceful jerks which can affect the whole body or just part of it  Absence seizures: May appear to be daydreaming or spacing out   The person is momentarily unresponsive and unaware of what is happening around him/her  Tonic seizures: Stiffening of part or of the entire body  Generalized Tonic-Clonic Seizures  Grand-mal seizure   Sudden loss of consciousness with body stiffening followed by continuous jerking movements  A blue tinge around the mouth is likely but lack of oxygen is rare  Loss of bladder and/or bowel control may occur

## 2022-05-25 NOTE — PROGRESS NOTES
[facility]  Patient's Name: David Art   Patient's : 1955   Visit Type: follow-up  Referring MD / PCP:  Dulce Maria Andrade MD    Assessment:  Mr David Art is a 78 y o  man with symptomatic localization related epilepsy due to a suspected seizure that happened after he sustained multifocal traumatic brain injuries in  after a fall from a significant height  His MRI brain study from  showed the sequela of right more than left frontal and temporal lobe encephalomalacia and gliosis and other posterior gyri injuries  We were able to transition him to levetiracetam and weaned off of carbamazepine  His hyponatremia was likely due to carbamazepine, which resolved after carbamzepine was discontinued  Levetiracetam is also less likely to cause vitamin D deficiency and osteoporosis compared to carbamamazepine  Plan:   1 - continue with Levetiracetam 1000mg twice a day  2 - you should continue with Caltrate one tab twice a day  3 - you can finish off the vitamin D 5000 unit one cap daily, no need to refill extra vitamin D supplementation now that you are no longer on carbamazepine  You should continue to follow-up your primary care doctor managing vitamin D  4 - follow-up in 6 months    Problem List Items Addressed This Visit        Nervous and Auditory    Localization-related focal epilepsy with complex partial seizures ([address] ) - Primary    Relevant Medications    levETIRAcetam (Keppra) 1000 MG tablet          Chief Complaint:    Chief Complaint   Patient presents with    Seizures      HPI:    David Art is a 78 y o  right handed male here for follow-up evaluation of seizure disorder and medication management  Interval History 2022  He reports that he has been doing great, he feels that his cognition is clearer on Levetiracetam   He has not had any episode of altered awareness, unresponsiveness, or loss of consciousness    He has not had any mood or anxiety symptoms, no worsening of irritability  AED/side effects/compliance:  Levetiracetam [id number]  He reports no side effects    Event/Seizure semiology:  1  Unknown seizure type    Prior Epilepsy History:  Intake History 11/17/2021  Records from Dr Ousmane Giles are limited with respect to epilepsy diagnosis  Reference to a head injury in 1996 resulting in seizures  Mr Lia Franklin recalled that 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  He has no recollection of what happened, no memory of the accident  He believes that he was in [facility], "not with it"  He ended up in [place] [id number]  He may have been in a coma  He may have hit the back of his head on the concrete curve, there was a fracture and there was an injury (area of "dead brain")  It was about 6 months later he had an episode  He recalled he was at work, then woke up in the hospital   He had no recollection what happened  His wife recalled that Woo Sadie was being taken to the hospital, but no information was provided to him  He does not what was the diagnosis  He went to see a neurologist (but does not remember the name of the doctor)  He had a 24 hours ambulatory EEG study  He was put on carbamazepine 200-200, to be on the safe side, so that he would never have another episode  He was never given a formal diagnosis  He has been seen by Dr Ousmane Giles ever since this episode happened  He has not seen a neurologist for years  The patient denies any history of myoclonus, staring spells, automatisms, unexplained hyperkinetic behaviors, olfactory / gustatory hallucinations, epigastric rising events, susan vu events, visual hallucinations, unexplained nocturnal enuresis, or nocturnal tongue biting  Interval History 2/16/2022  -750, -200  He started levetiracetam titrated to 750-750  He also reduced carbamazepine down to 200mg twice a day    He has also started calcium and vitamin D supplementation  There has been no recurrent seizure since   His mood and balance has been good  He feels sharper  Special Features  Status epilepticus: No  Self Injury Seizures: No  Precipitating Factors: None  Post-ictal state: None    Epilepsy Risk Factors:  Abnormal pregnancy: No  Abnormal birth/: No  Abnormal Development: No  Febrile seizures, simple: No  Febrile seizures, complex: No  CNS infection: No  Mental retardation: No  Cerebral palsy: No  Head injury (moderate/severe): Yes -  fell from a ladder with severe head injury, coma  CNS neoplasm: No  CNS malformation: No  Neurosurgical procedure: No  Stroke: No  CNS autoimmune disorder: No  Alcohol abuse: No  Drug abuse: No  Family history Sz/epilepsy: No    Prior AEDs:  medication Time used Reason to stop   carbamazepine ?-now hyponatremia   levetiracetam -now      Prior workup:  x  Imagin2015 - MRI brain study  There are multifocal regions of encephalomalacia and gliosis  The most significant ones are right more than left anterior frontal lobe involvement (quite extensive T2 hyperintensity on the right extending down the white matter to the frontal horn of the lateral ventricle), right more than left anterior temporal pole, and specific gyri in the bilateral posterior lateral frontal lobes (anterior to the precentral gyrus), and one gyrus on the left parietal region      2021 - DEXA  Consistent with osteoporosis  T score -2 2 (right and left femoral neck)  T score -2 7 (lumbar spine)    EEGs:  2021  Normal awake study    Labs:  Component      Latest Ref Rng & Units 2022   Sodium      135 - 147 mmol/L  137   Potassium      3 5 - 5 3 mmol/L  4 7   Chloride      96 - 108 mmol/L  103   CO2      21 - 32 mmol/L  28   Anion Gap      4 - 13 mmol/L  6   BUN      5 - 25 mg/dL  12   Creatinine      0 60 - 1 30 mg/dL  1 07   GLUCOSE FASTING      65 - 99 mg/dL  98   Calcium      8 4 - 10 2 mg/dL  10 0 eGFR      ml/min/1 73sq m  71   CARBAMAZEPINE LEVEL      4 0 - 12 0 ug/mL <0 5 (L)    LEVETIRACETA (KEPPRA)      10 0 - 40 0 ug/mL  9 1 (L)   Vit D, 25-Hydroxy      30 0 - 100 0 ng/mL  60 7     General exam   /80 (BP Location: Left arm, Patient Position: Sitting, Cuff Size: Standard)   Pulse 69   Ht 6' 1" (1 854 m)   Wt 102 kg (224 lb)   BMI 29 55 kg/m²    Appearance: normally developed, appears well  Carotids: not assessed  Cardiovascular: regular rate and rhythm and normal heart sounds  Pulmonary: clear to auscultation    HEENT: anicteric and neck is supple   Fundoscopy: not assessed    Mental status  Orientation: alert and oriented to name, place, time  Fund of Knowledge: intact   Attention and Concentration: intact  Current and Remote Memory:intact  Language: spontaneous speech is normal and comprehension is intact    Cranial Nerves  CN 1: not tested  CN 2: pupils equal round reactive to direct and consenual light   CN 3, 4, 6: EOMI, no nystagmus  CN 5:sensation intact to all distribution V1, V2, V3  CN 7:muscles of facial expression are symmetric  CN 8:not assessed  CN 9, 10:no dysarthria present  CN 11:symmetric strength of sternocleidomastoid and trapezius muscles  CN 12:tongue is midline    Motor:  Bulk, Tone: normal bulk, normal tone  Pronation: no pronator drift, normal barrel roll  Strength: Symmetric strength of the arms and legs, no lateralizing weakness   Abnormal movements: no abnormal movements are present    Sensory:  Lighttouch: intact in all limbs  Romberg:normal    Coordination:  FNF:FNF bilaterally intact  IKE:clumsy worse on the right hand than the left hand  FFM:intact  Gait/Station:normal gait and normal tandem gait    Reflexes:  Not assessed    Past Medical/Surgical History:  Patient Active Problem List   Diagnosis    Primary hypertension    Other specified hypothyroidism    Other hyperlipidemia    Localization-related focal epilepsy with complex partial seizures ([facility] [address]    Hyponatremia    Traumatic brain injury [address] [address] )    Anticonvulsant drug-induced bone softening     Past Surgical History:   Procedure Laterality Date    ROTATOR CUFF REPAIR Bilateral     TONSILLECTOMY       Past Psychiatric History:  Depression: No  Anxiety: No  Psychosis: No    Medications:    Current Outpatient Medications:     amLODIPine (NORVASC) 5 mg tablet, Take 5 mg by mouth in the morning , Disp: , Rfl:     Calcium Carbonate-Vitamin D 600-400 MG-UNIT per tablet, Take 1 tablet by mouth 2 (two) times a day, Disp: , Rfl:     levETIRAcetam (Keppra) 1000 MG tablet, Take 1 tablet (1,000 mg total) by mouth in the morning and 1 tablet (1,000 mg total) in the evening , Disp: 180 tablet, Rfl: 3    levothyroxine 200 mcg tablet, Take 200 mcg by mouth daily, Disp: , Rfl:     lisinopril (ZESTRIL) 20 mg tablet, Take 20 mg by mouth daily, Disp: , Rfl:     rosuvastatin (CRESTOR) 20 MG tablet, Take 20 mg by mouth in the morning , Disp: , Rfl:     Allergies:  No Known Allergies    Family history:  Family History   Problem Relation Age of Onset    Diabetes Father      There is no family history of seizure, epilepsy or developmental delay  Social History  Living situation:  Lives with spouse  Work:  Retired cable construction, 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); completed high school  Driving:  Yes, [place] 's license   reports that he has quit smoking  He has never used smokeless tobacco  He reports previous alcohol use  He reports that he does not use drugs  Review of Systems  A review of at least 12 organ/systems was obtained by the medical assistant and reviewed by me, including additional positives/negatives:  A review of at least 12 organ/systems was evaluated and there are no complaints      Decision making was of high-complexity due to the patient's high risk condition (seizures), psychiatric and neuropsychological comorbidities, behavioral problems, memory and cognitive problems and medication side effects  The total amount of time spent with the patient along with pre-chart and post-chart preparation was 25 minutes on the calendar day of the date of service  This included history taking, physical exam, review of ancillary testing, counseling provided to the patient regarding diagnosis, medications, treatment, and risk management, and other communication to the patient's providers and/or family    Start time: 9:45AM  End time: 10:10AM

## 2022-07-25 DIAGNOSIS — G40.209 LOCALIZATION-RELATED FOCAL EPILEPSY WITH COMPLEX PARTIAL SEIZURES (HCC): ICD-10-CM

## 2022-07-25 RX ORDER — LEVETIRACETAM 1000 MG/1
1000 TABLET ORAL 2 TIMES DAILY
Qty: 180 TABLET | Refills: 3 | Status: SHIPPED | OUTPATIENT
Start: 2022-07-25

## 2022-07-25 NOTE — TELEPHONE ENCOUNTER
[facility] neurology office requesting refill be sent to [facility] mail order 90 day supply  Please refill if agreeable to refill medication pended  LOV: HARDIK 05/25/22 plan was to continuecontinue with Levetiracetam 1000mg twice a day     follow up scheduled 12/22/22 at [facility] office      Task sent to Hardik's clinical team and MA for refill

## 2022-08-15 ENCOUNTER — APPOINTMENT (OUTPATIENT)
Dept: LAB | Facility: HOSPITAL | Age: 67
End: 2022-08-15
Attending: INTERNAL MEDICINE
Payer: COMMERCIAL

## 2022-08-15 DIAGNOSIS — E87.1 HYPOSMOLALITY SYNDROME: ICD-10-CM

## 2022-08-15 DIAGNOSIS — F52.21 ERECTILE DYSFUNCTION OF NONORGANIC ORIGIN: ICD-10-CM

## 2022-08-15 DIAGNOSIS — E83.52 HYPERCALCEMIA: ICD-10-CM

## 2022-08-15 DIAGNOSIS — E03.8 TOXIC DIFFUSE GOITER WITH PRETIBIAL MYXEDEMA: ICD-10-CM

## 2022-08-15 DIAGNOSIS — E78.5 HYPERLIPIDEMIA, UNSPECIFIED HYPERLIPIDEMIA TYPE: ICD-10-CM

## 2022-08-15 DIAGNOSIS — E05.00 TOXIC DIFFUSE GOITER WITH PRETIBIAL MYXEDEMA: ICD-10-CM

## 2022-08-15 DIAGNOSIS — I10 ESSENTIAL HYPERTENSION, MALIGNANT: ICD-10-CM

## 2022-08-15 LAB
ALBUMIN SERPL BCP-MCNC: 4.5 G/DL (ref 3.5–5)
ALP SERPL-CCNC: 75 U/L (ref 34–104)
ALT SERPL W P-5'-P-CCNC: 33 U/L (ref 7–52)
ANION GAP SERPL CALCULATED.3IONS-SCNC: 7 MMOL/L (ref 4–13)
AST SERPL W P-5'-P-CCNC: 27 U/L (ref 13–39)
BASOPHILS # BLD AUTO: 0.04 THOUSANDS/ΜL (ref 0–0.1)
BASOPHILS NFR BLD AUTO: 1 % (ref 0–1)
BILIRUB SERPL-MCNC: 0.74 MG/DL (ref 0.2–1)
BUN SERPL-MCNC: 12 MG/DL (ref 5–25)
CALCIUM SERPL-MCNC: 10 MG/DL (ref 8.4–10.2)
CHLORIDE SERPL-SCNC: 103 MMOL/L (ref 96–108)
CO2 SERPL-SCNC: 27 MMOL/L (ref 21–32)
CREAT SERPL-MCNC: 0.98 MG/DL (ref 0.6–1.3)
EOSINOPHIL # BLD AUTO: 0.17 THOUSAND/ΜL (ref 0–0.61)
EOSINOPHIL NFR BLD AUTO: 3 % (ref 0–6)
ERYTHROCYTE [DISTWIDTH] IN BLOOD BY AUTOMATED COUNT: 11.9 % (ref 11.6–15.1)
GFR SERPL CREATININE-BSD FRML MDRD: 80 ML/MIN/1.73SQ M
GLUCOSE P FAST SERPL-MCNC: 91 MG/DL (ref 65–99)
HCT VFR BLD AUTO: 44.9 % (ref 36.5–49.3)
HGB BLD-MCNC: 15.7 G/DL (ref 12–17)
IMM GRANULOCYTES # BLD AUTO: 0.04 THOUSAND/UL (ref 0–0.2)
IMM GRANULOCYTES NFR BLD AUTO: 1 % (ref 0–2)
LYMPHOCYTES # BLD AUTO: 1.92 THOUSANDS/ΜL (ref 0.6–4.47)
LYMPHOCYTES NFR BLD AUTO: 29 % (ref 14–44)
MCH RBC QN AUTO: 32.2 PG (ref 26.8–34.3)
MCHC RBC AUTO-ENTMCNC: 35 G/DL (ref 31.4–37.4)
MCV RBC AUTO: 92 FL (ref 82–98)
MONOCYTES # BLD AUTO: 0.69 THOUSAND/ΜL (ref 0.17–1.22)
MONOCYTES NFR BLD AUTO: 10 % (ref 4–12)
NEUTROPHILS # BLD AUTO: 3.78 THOUSANDS/ΜL (ref 1.85–7.62)
NEUTS SEG NFR BLD AUTO: 56 % (ref 43–75)
NRBC BLD AUTO-RTO: 0 /100 WBCS
PLATELET # BLD AUTO: 267 THOUSANDS/UL (ref 149–390)
PMV BLD AUTO: 9.6 FL (ref 8.9–12.7)
POTASSIUM SERPL-SCNC: 4.2 MMOL/L (ref 3.5–5.3)
PROT SERPL-MCNC: 8.1 G/DL (ref 6.4–8.4)
PSA SERPL-MCNC: 1 NG/ML (ref 0–4)
RBC # BLD AUTO: 4.88 MILLION/UL (ref 3.88–5.62)
SODIUM SERPL-SCNC: 137 MMOL/L (ref 135–147)
TSH SERPL DL<=0.05 MIU/L-ACNC: 1.32 UIU/ML (ref 0.45–4.5)
WBC # BLD AUTO: 6.64 THOUSAND/UL (ref 4.31–10.16)

## 2022-08-15 PROCEDURE — 36415 COLL VENOUS BLD VENIPUNCTURE: CPT

## 2022-08-15 PROCEDURE — 85025 COMPLETE CBC W/AUTO DIFF WBC: CPT

## 2022-08-15 PROCEDURE — 84443 ASSAY THYROID STIM HORMONE: CPT

## 2022-08-15 PROCEDURE — G0103 PSA SCREENING: HCPCS

## 2022-08-15 PROCEDURE — 80053 COMPREHEN METABOLIC PANEL: CPT

## 2022-11-15 ENCOUNTER — TELEPHONE (OUTPATIENT)
Dept: NEUROLOGY | Facility: CLINIC | Age: 67
End: 2022-11-15

## 2022-11-15 NOTE — TELEPHONE ENCOUNTER
Called and spoke to patient  Patient confirmed upcoming apt Dr Jessica Huitron on 12/7/22 @ 9 am  Patient has no issues or concerns at this time

## 2022-12-07 ENCOUNTER — OFFICE VISIT (OUTPATIENT)
Dept: NEUROLOGY | Facility: CLINIC | Age: 67
End: 2022-12-07

## 2022-12-07 VITALS
SYSTOLIC BLOOD PRESSURE: 132 MMHG | HEART RATE: 72 BPM | DIASTOLIC BLOOD PRESSURE: 84 MMHG | WEIGHT: 227.6 LBS | HEIGHT: 73 IN | BODY MASS INDEX: 30.17 KG/M2

## 2022-12-07 DIAGNOSIS — G40.209 LOCALIZATION-RELATED FOCAL EPILEPSY WITH COMPLEX PARTIAL SEIZURES (HCC): Primary | ICD-10-CM

## 2022-12-07 RX ORDER — MULTIVITAMIN
1 TABLET ORAL DAILY
COMMUNITY

## 2022-12-07 RX ORDER — LEVETIRACETAM 1000 MG/1
1000 TABLET ORAL 2 TIMES DAILY
Qty: 180 TABLET | Refills: 3 | Status: SHIPPED | OUTPATIENT
Start: 2022-12-07

## 2022-12-07 NOTE — PROGRESS NOTES
Review of Systems   Constitutional: Negative  Negative for appetite change and fever  HENT: Negative  Negative for hearing loss, tinnitus, trouble swallowing and voice change  Eyes: Negative  Negative for photophobia, pain and visual disturbance  Respiratory: Negative  Negative for shortness of breath  Cardiovascular: Negative  Negative for palpitations  Gastrointestinal: Negative  Negative for nausea and vomiting  Endocrine: Negative  Negative for cold intolerance  Genitourinary: Negative  Negative for dysuria, frequency and urgency  Musculoskeletal: Negative  Negative for gait problem, myalgias and neck pain  Skin: Negative  Negative for rash  Allergic/Immunologic: Negative  Neurological: Negative  Negative for dizziness, tremors, seizures, syncope, facial asymmetry, speech difficulty, weakness, light-headedness, numbness and headaches  Hematological: Negative  Does not bruise/bleed easily  Psychiatric/Behavioral: Negative  Negative for confusion, hallucinations and sleep disturbance

## 2022-12-07 NOTE — PATIENT INSTRUCTIONS
Plan:   1 - continue with Levetiracetam 1000mg twice a day  2 - check levetiracetam level at the next set of blood work  3 - if you have a convulsive seizure for less than 2 minutes monitor him at home for recovery, but if the seizure is longer than 2 minutes, no recovery after 5 minutes, or focal neurological deficits for more than 5 minutes call 911/EMS or go to the nearest emergency department for evaluation  Osteoporosis  1 - follow-up with your primary care provider to determine supplements that are needed such as Caltrate one tab twice a day and/or extra vitamin D supplements     Follow-up in 1 year    I discussed the warning signs of stroke with the patient  I reviewed that stroke and transient ischemic attack are signs of acute neurologic impairment due to abnormal cerebrovascular pathology either from ischemia (lack of blood flow) or hemorrhage (excessive bleeding)  Warning signs include but are not limited to acute (immediate onset) speech impairment, inability to understand language, loss of vision, visual deficits, lateralizing weakness, facial weakness (facial droop), sensory loss, ataxia, gait imbalance, sensation of being uncoordinated, or changes in perception/sensorium  Patient was instructed to call 911/EMS for immediate attention for stroke alert to be started  It is important to know the time symptoms started or when the patient was last known well  FIRST AID FOR SEIZURES    What to Do If You Witness a Seizure:     Generalized convulsive (called a generalized tonic-clonic or grand mal seizure)  During this seizure, the person may cry out, suddenly stiffen up, make jerking movements, and fall  The person may turn pale or blue from difficulty breathing  Actions:  Stay calm  Talk in a soothing voice and if possible keep onlookers away  Prevent injury  Move objects away that the person might hit while jerking uncontrollably  Time when the seizure starts and ends   Most seizures stop after only a few minutes  Turn him or her gently onto one side  This will help keep the airway clear  Never place anything in his/her mouth or give him/her anything by mouth during a seizure  -- Do not give the person water, pills, or food until fully alert  Loosen tight clothing or jewelry around his/her neck  Make the person as comfortable as possible  Place something soft under their head  Do not hold the person down  If the person having a seizure thrashes around there is no need for you to restrain them  They are more likely to be combative if restrained  Remember to consider your safety as well  Keep onlookers away  Be sensitive and supportive, and ask others to do the same  Stay with the person until he/she is fully alert  Complex partial seizure (confusional spells)  During this kind of seizure, the person may have a glassy stare; give no response or inappropriate responses when questioned; sit, stand, or walk about aimlessly; make lip smacking or chewing motions; fidget with clothes; appear to be drunk, drugged, or confused  Actions:  Make sure the person is safe and won’t harm themselves  Try to remove harmful objects from around the person (tools, utensils, glasses)  Do NOT be aggressive or attempt to restrain the person  They are more likely to be combative if restrained  Remember to consider your safety as well  Help prevent the person from wandering, and direct the person to chair or safe position  Never place anything in his/her mouth or give him/her anything by mouth during a seizure  -- Do not give the person water, pills, or food until fully alert  Keep onlookers away  Be sensitive and supportive, and ask others to do the same  Stay with the person until he/she is fully alert  CALL 911 if:  A convulsive seizure lasts more than 5 minutes  The person turns blue during the seizure  The person does not start breathing after the seizure   Begin mouth to mouth resuscitation if this would occur  The person has one seizure right after the other without coming back to normal consciousness between the seizures  The person has not regained consciousness or is still confused after 30 minutes  You know the person does not have epilepsy  You know the person has diabetes or low blood sugar  The person is pregnant, ill, or injured  The seizure occurred in water, because the person may have inhaled water  The person requests an ambulance or medical help  Rescue medication  Your doctor may prescribe a rescue medication such as lorazepam (Ativan), diazepam (Valium / Diastat), or clonazepam (Klonopin) to terminate a seizure or if you have a history of cluster of seizures  Follow the instructions given by your doctor for these medications    Recognizing Common Seizures (examples)   Simple partial seizures: Isolated twitching, numbness, sweating, dizziness, nausea/vomiting, disturbances to hearing, vision, smell or taste  No loss of consciousness occurs, and the person remains aware of his/her environment  Complex partial seizures: Staring, motionless, picking at clothes, smacking lips, swallowing repeatedly or wandering around  The person is not aware of their surroundings and is not fully responsive  Atonic seizures: “Drop attacks” or sudden, rapid fall to ground with rapid recovery  Myoclonic seizures: Brief forceful jerks which can affect the whole body or just part of it  Absence seizures: May appear to be daydreaming or “spacing out ” The person is momentarily unresponsive and unaware of what is happening around him/her  Tonic seizures: Stiffening of part or of the entire body  Generalized Tonic-Clonic Seizures  “Grand-mal seizure ” Sudden loss of consciousness with body stiffening followed by continuous jerking movements  A blue tinge around the mouth is likely but lack of oxygen is rare  Loss of bladder and/or bowel control may occur      SEIZURE SAFETY    Don’t let fear of seizures keep you at home  Be smart about your activities to make sure you are safe  The guidelines below can help you be as safe as possible  Make sure the people around you are aware of: What happens when you have a seizure  Correct seizure first aid  First aid for choking (consider taking a basic life support class)  When they should know to call 911 or for medical help    Avoid common triggers of seizures:  Missing your medications  Not getting enough sleep  Drinking alcohol  Using illegal drugs    Safety measures for at home:  In the bathroom:   Do not take baths in the bathtub  Instead, take only showers  Try to have a family member available while you are in the shower  Make sure the drain in the bathtub/shower is working properly to avoid pooling of water  You can consider a fitted shower seat  Recessed soap trays can minimize injury if you would happen to fall in the shower  Bathroom doors can be hung to open outwards, so that the door can still be opened if you fall against the door  Do not lock the bathroom door  Use an “Occupied” sign on the door or other signal to let others know you are in the bathroom  Safety locks can be obtained from the [employer]  On your water heater, set your water temperature to a warm temperature that is not scalding to avoid burns from very hot water  Put non-skid strips/ in the bathtub  Use an electric shaver  Avoid any electrical appliances (including electric shaver) in the bathroom or near water  Use shatterproof glass for mirrors  In the kitchen:   When possible, cook using a microwave  Only cook or use electrical appliances when someone else is in the house and available  As much as possible, grill food and avoid fryers or frying food  Use the back burners of the stove and turn the pot handles toward the back of the stove  Avoid carrying hot pans, pots of boiling water, or very hot food   Serve food or liquids directly from the stove  At the least, minimize the distance hot food is carried  Use precut foods or food processers to limit the need to use knives  Use plastic or durable cups, dishes, and containers instead of breakable glass items  In the bedroom  Low level and wide beds (like a futon) can reduce risk of injury of falling out of bed  If there is a high risk of falling out of bed, you can consider simply putting the mattress on the floor  Avoid sleeping on top bunks of bunk beds  Place a soft carpet on the floor  Around the house  Pad sharp corners of tables, chairs, etc  Round tables and furniture can be considered to avoid sharp corners  Avoid open flames  Place a screen in front of fireplaces and don’t build a fire alone  Allow for open spaces with furniture  Avoid loose throw rugs or slippery floors  Non-slip radha or cushioned radha can help reduce injury from a fall  Fireproof fabrics and furniture are best, and especially important if you smoke  Doors and windows with safety glass are safer if someone falls against them  Avoid lights and heaters that could easily be knocked over  Use curling irons or clothing irons that have automatic shut off switches  Make sure motor driven equipment or lawn mowers have “dead man’s” handles or seats so they will turn off if you release pressure  Safety measures when away from home  [address] [place]  law mandates that you cannot drive for 6 months after your most recent seizure  [place] law mandates that you cannot drive for 6 months after your most recent seizure  Work/Travel  Wear a medical alert bracelet or necklace at all times  Wear a helmet and use protective clothing/equipment when appropriate  Consider telling co-workers and travel companions that you have epilepsy and what to do if you have a seizure  Avoid irregular shifts or disruptions of a regular sleep pattern    Take your medications on time and keep an updated list of medications in your purse or wallet  Do not climb to heights or operate heavy machinery  Stand back from the edges of roads or bus/train platforms when traveling  If you wander when you have a seizure, take a friend along for the trip  Recreation  Swimming can be dangerous  Do not swim alone, in open water, or in murky water that you cannot see the bottom  Caregivers should be with you in the pool at all times and must be physically able to get you out of the water  Use a flotation device  Scuba diving is not recommended since during a seizure people are unaware of their surroundings  Having a seizure underwater can be deadly and can endanger the lives of others  Kayaking and canoeing can be especially dangerous  People with epilepsy are at a higher risk of becoming trapped underneath a canoe or kayak  Wear a helmet when playing contact sports, biking, or if there is a risk of falling  Patients with epilepsy are at a higher risk of head injury when playing contact sports  Avoid riding a bike, running, or other activities around busy roads, steep hills, or secluded areas  Exercise on soft surfaces  Theme [place]: many people with epilepsy can go on rides depending on their type of seizures  For some people, stress or excitement can trigger a seizure  Rollercoasters (especially if you are upside-down) are more dangerous for people with epilepsy  Medications  Take your medications on time  If you have trouble remembering, set alarms on your phone  You can visit www  [employer]  com to set up reminders through text message to help you remember to take your medications  Use a pillbox to help you keep track of your medications  When out of the house, take any needed medications with you  Consider keeping one or two extra doses with you in case you are unexpectedly away from home longer than planned    If you realize you missed a dose of your medications and it is less than 2 hours until your next dose, skip the missed dose  Do not double up your medication dose  If it is more than 2 hours until your next dose, you can take the missed dose  Avoid drinking alcohol, since this can enhance effects of your seizure medications  Be aware of common and major side effects of your medications  Keep your medications out of the reach of children  Parenting:  Childproof your home as much as possible  It is possible that you could drop your baby if you have a seizure while holding or feeding them  If you are nursing a baby, sit on the floor or bed with your back supported so the baby will not fall far if you should lose consciousness  Feed the baby while he or she is seated in an infant seat  Dress, change, and sponge bathe the baby on the floor  Move the baby around in a stroller or small crib  Keep a young baby in a playpen when you are alone, and a toddler in an indoor play yard, or childproof one room and use safety benitez at the doors  When out of the house, use a bungee-type cord or restraint harness so your child cannot wander away if you have a seizure that affects your awareness

## 2022-12-07 NOTE — PROGRESS NOTES
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 that happened after he sustained multifocal traumatic brain injuries in  after a fall from a significant height  There have been no recurrent seizure since ; however he is still at risk for focal seizures due to the presence of bilateral (right more than left) frontal and temporal lobe encephalomalacia and gliosis and other posterior gyri injuries  He is tolerating levetiracetam without side effects or breakthrough seizures  We had weaned him off of carbamazepine due to osteoporosis and potential medication interactions and hyponatremia  Now that he is off of carbamazepine, he does not have hyponatremia  He will follow-up with his PCP for the management of osteoporosis  Plan:   1 - continue with Levetiracetam 1000mg twice a day  2 - check levetiracetam level at the next set of blood work  3 - if you have a convulsive seizure for less than 2 minutes monitor him at home for recovery, but if the seizure is longer than 2 minutes, no recovery after 5 minutes, or focal neurological deficits for more than 5 minutes call 911/EMS or go to the nearest emergency department for evaluation  Osteoporosis  1 - follow-up with your primary care provider to determine supplements that are needed such as Caltrate one tab twice a day and/or extra vitamin D supplements     Follow-up in 1 year    I discussed the warning signs of stroke with the patient  I reviewed that stroke and transient ischemic attack are signs of acute neurologic impairment due to abnormal cerebrovascular pathology either from ischemia (lack of blood flow) or hemorrhage (excessive bleeding)    Warning signs include but are not limited to acute (immediate onset) speech impairment, inability to understand language, loss of vision, visual deficits, lateralizing weakness, facial weakness (facial droop), sensory loss, ataxia, gait imbalance, sensation of being uncoordinated, or changes in perception/sensorium  Patient was instructed to call 911/EMS for immediate attention for stroke alert to be started  It is important to know the time symptoms started or when the patient was last known well  Problem List Items Addressed This Visit        Nervous and Auditory    Localization-related focal epilepsy with complex partial seizures ([address] ) - Primary    Relevant Medications    levETIRAcetam (Keppra) 1000 MG tablet    Other Relevant Orders    Levetiracetam level       Chief Complaint:    Chief Complaint   Patient presents with   • Seizures      HPI:    Elizabeth Proctor is a 78 y o  right handed male here for follow-up evaluation of seizure disorder    Interval History 12/7/2022  He reports no seizures and no stress  There is nothing to do during care home  He does wood work at home  He has no complaints from levetiracetam    AED/side effects/compliance:  Levetiracetam [id number]  No side effects    Event/Seizure semiology:  1  Unknown seizure type    Prior Epilepsy History:  Intake History 11/17/2021  Records from Dr Omkar Crenshaw are limited with respect to epilepsy diagnosis  Reference to a head injury in 1996 resulting in seizures  Mr Lindy Sellers recalled that 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  He has no recollection of what happened, no memory of the accident  He believes that he was in [facility], "not with it"  He ended up in [facility]  He may have been in a coma  He may have hit the back of his head on the concrete curve, there was a fracture and there was an injury (area of "dead brain")  It was about 6 months later he had an episode    He recalled he was at work, then woke up in the hospital   He had no recollection what happened  His wife recalled that Tatiana Tobar was being taken to the hospital, but no information was provided to him  He does not what was the diagnosis  He went to see a neurologist (but does not remember the name of the doctor)  He had a 24 hours ambulatory EEG study  He was put on carbamazepine 200-200, to be on the safe side, so that he would never have another episode  He was never given a formal diagnosis  He has been seen by Dr Margot Andrews ever since this episode happened  He has not seen a neurologist for years  The patient denies any history of myoclonus, staring spells, automatisms, unexplained hyperkinetic behaviors, olfactory / gustatory hallucinations, epigastric rising events, susan vu events, visual hallucinations, unexplained nocturnal enuresis, or nocturnal tongue biting  Interval History 2022  -750, -200  He started levetiracetam titrated to 750-750  He also reduced carbamazepine down to 200mg twice a day  He has also started calcium and vitamin D supplementation  There has been no recurrent seizure since   Interval History 2022  LEV [id number]  Off CBZ  He has not had any episode of altered awareness, unresponsiveness, or loss of consciousness      Special Features  Status epilepticus: No  Self Injury Seizures: No  Precipitating Factors: None  Post-ictal state: None    Epilepsy Risk Factors:  Abnormal pregnancy: No  Abnormal birth/: No  Abnormal Development: No  Febrile seizures, simple: No  Febrile seizures, complex: No  CNS infection: No  Mental retardation: No  Cerebral palsy: No  Head injury (moderate/severe): Yes -  fell from a ladder with severe head injury, coma  CNS neoplasm: No  CNS malformation: No  Neurosurgical procedure: No  Stroke: No  CNS autoimmune disorder: No  Alcohol abuse: No  Drug abuse: No  Family history Sz/epilepsy: No    Prior AEDs:  medication Time used Reason to stop   carbamazepine ?-now hyponatremia   levetiracetam -now      Prior workup:  x  Imagin2015 - MRI brain study  There are multifocal regions of encephalomalacia and gliosis  The most significant ones are right more than left anterior frontal lobe involvement (quite extensive T2 hyperintensity on the right extending down the white matter to the frontal horn of the lateral ventricle), right more than left anterior temporal pole, and specific gyri in the bilateral posterior lateral frontal lobes (anterior to the precentral gyrus), and one gyrus on the left parietal region      2021 - DEXA  Consistent with osteoporosis  T score -2 2 (right and left femoral neck)  T score -2 7 (lumbar spine)    EEGs:  2021  Normal awake study    Labs:  Component      Latest Ref Rng & Units 2022 8/15/2022   Sodium      135 - 147 mmol/L  137   Potassium      3 5 - 5 3 mmol/L  4 2   Chloride      96 - 108 mmol/L  103   CO2      21 - 32 mmol/L  27   Anion Gap      4 - 13 mmol/L  7   BUN      5 - 25 mg/dL  12   Creatinine      0 60 - 1 30 mg/dL  0 98   GLUCOSE FASTING      65 - 99 mg/dL  91   Calcium      8 4 - 10 2 mg/dL  10 0   AST      13 - 39 U/L  27   ALT      7 - 52 U/L  33   Alkaline Phosphatase      34 - 104 U/L  75   Total Protein      6 4 - 8 4 g/dL  8 1   Albumin      3 5 - 5 0 g/dL  4 5   TOTAL BILIRUBIN      0 20 - 1 00 mg/dL  0 74   eGFR      ml/min/1 73sq m  80   LEVETIRACETA (KEPPRA)      10 0 - 40 0 ug/mL 9 1 (L)    Vit D, 25-Hydroxy      30 0 - 100 0 ng/mL 60 7      General exam   /84 (BP Location: Right arm, Patient Position: Sitting, Cuff Size: Standard)   Pulse 72   Ht 6' 1" (1 854 m)   Wt 103 kg (227 lb 9 6 oz)   BMI 30 03 kg/m²    Appearance: normal develop male, pleasant  Carotids: no carotid bruit is heard  Cardiovascular: regular rate and rhythm and no murmur is present  Pulmonary: clear to auscultation   Extremities: no edema    HEENT: anicteric and neck is supple   Fundoscopy: not assessed    Mental status  Orientation: alert and oriented to name, place, time  Fund of Knowledge: intact   Attention and Concentration: WORLD - DLROW  Current and Remote Memory:intact  Language: spontaneous speech is normal and comprehension is intact    Cranial Nerves  CN 1: not tested  CN 2: pupils equal round reactive to direct and consenual light   CN 3, 4, 6: EOMI, no nystagmus  CN 5:sensation intact to all distribution V1, V2, V3  CN 7:muscles of facial expression are symmetric  CN 8:symmetric to finger rubs bilaterally  CN 9, 10:no dysarthria present  CN 11:symmetric SCM with head turns  CN 12:tongue is midline    Motor:  Bulk, Tone: normal bulk, normal tone  Pronation: no pronator drift  Strength: Symmetric strength of the arms and legs, no lateralizing weakness   Abnormal movements: no abnormal movements are present    Sensory:  Lighttouch: intact in all limbs  Romberg:normal    Coordination:  FNF:FNF bilaterally intact  IKE:intact  FFM:intact  Gait/Station:normal gait and normal tandem gait    Reflexes:  Not assessed    Past Medical/Surgical History:  Patient Active Problem List   Diagnosis   • Primary hypertension   • Other specified hypothyroidism   • Other hyperlipidemia   • Localization-related focal epilepsy with complex partial seizures (HCC)   • Hyponatremia   • Traumatic brain injury   • Anticonvulsant drug-induced bone softening     Past Surgical History:   Procedure Laterality Date   • ROTATOR CUFF REPAIR Bilateral    • TONSILLECTOMY       Past Psychiatric History:  Depression: No  Anxiety: No  Psychosis: No    Medications:    Current Outpatient Medications:   •  amLODIPine (NORVASC) 5 mg tablet, Take 5 mg by mouth in the morning , Disp: , Rfl:   •  Calcium Carbonate-Vitamin D 600-400 MG-UNIT per tablet, Take 1 tablet by mouth 2 (two) times a day, Disp: , Rfl:   •  levETIRAcetam (Keppra) 1000 MG tablet, Take 1 tablet (1,000 mg total) by mouth 2 (two) times a day, Disp: 180 tablet, Rfl: 3  •  levothyroxine 200 mcg tablet, Take 200 mcg by mouth daily, Disp: , Rfl:   •  lisinopril (ZESTRIL) 20 mg tablet, Take 20 mg by mouth daily, Disp: , Rfl:   •  Multiple Vitamin (multivitamin) tablet, Take 1 tablet by mouth daily, Disp: , Rfl:   •  rosuvastatin (CRESTOR) 20 MG tablet, Take 20 mg by mouth in the morning , Disp: , Rfl:     Allergies:  No Known Allergies    Family history:  Family History   Problem Relation Age of Onset   • Diabetes Father      There is no family history of seizure, epilepsy or developmental delay  Social History  Living situation:  Lives with spouse  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, 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); completed high school  Driving:  Yes, [place] 's license   reports that he has quit smoking  He has never used smokeless tobacco  He reports that he does not currently use alcohol  He reports that he does not use drugs  Review of Systems  A review of at least 12 organ/systems was obtained by the medical assistant and reviewed by me, including additional positives/negatives:  A review of at least 12 organ/systems was evaluated and there are no complaints  Decision making was of high-complexity due to the patient's high risk condition (seizures), psychiatric and neuropsychological comorbidities, behavioral problems, memory and cognitive problems and medication side effects  The total amount of time spent with the patient along with pre-chart and post-chart preparation was 22 minutes on the calendar day of the date of service  This included history taking, physical exam, review of ancillary testing, counseling provided to the patient regarding diagnosis, medications, treatment, and risk management, and other communication to the patient's providers and/or family    Start time: 9:15AM  End time: 9:37AM

## 2023-03-21 ENCOUNTER — HOSPITAL ENCOUNTER (EMERGENCY)
Facility: HOSPITAL | Age: 68
Discharge: HOME | End: 2023-03-21
Attending: EMERGENCY MEDICINE

## 2023-03-21 ENCOUNTER — APPOINTMENT (EMERGENCY)
Dept: RADIOLOGY | Facility: HOSPITAL | Age: 68
End: 2023-03-21

## 2023-03-21 VITALS
TEMPERATURE: 98.8 F | SYSTOLIC BLOOD PRESSURE: 131 MMHG | DIASTOLIC BLOOD PRESSURE: 82 MMHG | WEIGHT: 226.2 LBS | RESPIRATION RATE: 18 BRPM | BODY MASS INDEX: 29.98 KG/M2 | OXYGEN SATURATION: 93 % | HEART RATE: 99 BPM | HEIGHT: 73 IN

## 2023-03-21 DIAGNOSIS — R55 SYNCOPE: Primary | ICD-10-CM

## 2023-03-21 DIAGNOSIS — S00.01XA ABRASION OF SCALP, INITIAL ENCOUNTER: ICD-10-CM

## 2023-03-21 LAB
ALBUMIN SERPL BCP-MCNC: 4.4 G/DL (ref 3.5–5)
ALP SERPL-CCNC: 71 U/L (ref 34–104)
ALT SERPL W P-5'-P-CCNC: 27 U/L (ref 7–52)
ANION GAP SERPL CALCULATED.3IONS-SCNC: 9 MMOL/L (ref 4–13)
AST SERPL W P-5'-P-CCNC: 26 U/L (ref 13–39)
ATRIAL RATE: 89 BPM
BASOPHILS # BLD AUTO: 0.06 THOUSANDS/ÂΜL (ref 0–0.1)
BASOPHILS NFR BLD AUTO: 1 % (ref 0–1)
BILIRUB SERPL-MCNC: 0.54 MG/DL (ref 0.2–1)
BUN SERPL-MCNC: 13 MG/DL (ref 5–25)
CALCIUM SERPL-MCNC: 9.4 MG/DL (ref 8.4–10.2)
CARDIAC TROPONIN I PNL SERPL HS: 4 NG/L
CHLORIDE SERPL-SCNC: 103 MMOL/L (ref 96–108)
CO2 SERPL-SCNC: 24 MMOL/L (ref 21–32)
CREAT SERPL-MCNC: 0.91 MG/DL (ref 0.6–1.3)
EOSINOPHIL # BLD AUTO: 0.14 THOUSAND/ÂΜL (ref 0–0.61)
EOSINOPHIL NFR BLD AUTO: 2 % (ref 0–6)
ERYTHROCYTE [DISTWIDTH] IN BLOOD BY AUTOMATED COUNT: 12.4 % (ref 11.6–15.1)
GFR SERPL CREATININE-BSD FRML MDRD: 87 ML/MIN/1.73SQ M
GLUCOSE SERPL-MCNC: 106 MG/DL (ref 65–140)
HCT VFR BLD AUTO: 44.4 % (ref 36.5–49.3)
HGB BLD-MCNC: 14.8 G/DL (ref 12–17)
IMM GRANULOCYTES # BLD AUTO: 0.05 THOUSAND/UL (ref 0–0.2)
IMM GRANULOCYTES NFR BLD AUTO: 1 % (ref 0–2)
LYMPHOCYTES # BLD AUTO: 2.45 THOUSANDS/ÂΜL (ref 0.6–4.47)
LYMPHOCYTES NFR BLD AUTO: 28 % (ref 14–44)
MCH RBC QN AUTO: 30.7 PG (ref 26.8–34.3)
MCHC RBC AUTO-ENTMCNC: 33.3 G/DL (ref 31.4–37.4)
MCV RBC AUTO: 92 FL (ref 82–98)
MONOCYTES # BLD AUTO: 0.84 THOUSAND/ÂΜL (ref 0.17–1.22)
MONOCYTES NFR BLD AUTO: 9 % (ref 4–12)
NEUTROPHILS # BLD AUTO: 5.35 THOUSANDS/ÂΜL (ref 1.85–7.62)
NEUTS SEG NFR BLD AUTO: 59 % (ref 43–75)
NRBC BLD AUTO-RTO: 0 /100 WBCS
P AXIS: 36 DEGREES
PLATELET # BLD AUTO: 279 THOUSANDS/UL (ref 149–390)
PMV BLD AUTO: 9.9 FL (ref 8.9–12.7)
POTASSIUM SERPL-SCNC: 3.8 MMOL/L (ref 3.5–5.3)
PR INTERVAL: 154 MS
PROT SERPL-MCNC: 7.4 G/DL (ref 6.4–8.4)
QRS AXIS: -4 DEGREES
QRSD INTERVAL: 86 MS
QT INTERVAL: 346 MS
QTC INTERVAL: 420 MS
RBC # BLD AUTO: 4.82 MILLION/UL (ref 3.88–5.62)
SODIUM SERPL-SCNC: 136 MMOL/L (ref 135–147)
T WAVE AXIS: 36 DEGREES
VENTRICULAR RATE: 89 BPM
WBC # BLD AUTO: 8.89 THOUSAND/UL (ref 4.31–10.16)

## 2023-03-21 NOTE — DISCHARGE INSTRUCTIONS
Your labs, chest x-ray, EKG today were normal   Make sure you are drinking a lot of fluids  If you feel like you are going to pass out, sit down immediately, elevate your legs, drink additional fluids  Continue local wound care for your scalp abrasion  If you have any chest pain, shortness of breath, worsening headache, new weakness or numbness, persistent nausea or vomiting, confusion, return to the emergency department immediately  Follow-up with your primary care provider in 1 week for reassessment

## 2023-03-21 NOTE — ED PROVIDER NOTES
History  Chief Complaint   Patient presents with   • Syncope     Pt states around 6pm today pt was walking with his wife and wife witnessed syncopal episode  Pt wife states pt stated "I think I need to pee" and lost consciousness causing him to fall back hitting the back of his head on cement  Pt cannot recall incident  Laceration noted on back of head  Pt states no complaints at this time  Pt has hx of head injury from falling off ladder and sees neurologist (Dr Monica Betts)  Pt not on thinners     HPI  Patient is a 28-year-old male remote history of closed head injury with TBI on Keppra presenting for evaluation following apparent syncopal event  Patient was out walking with his wife, states that he felt a sensation of generalized warmth and like he needed to urinate then fell to the ground and hit his occiput  Patient had brief loss of consciousness  Patient had no seizure-like activity  Patient immediately return to normal mental status  Patient complains of some minor pain in the area of an abrasion on his occipital scalp  Patient with no hematoma in the area  Patient denies anticoagulation or antiplatelet use  Patient denies neck pain, back pain, trauma to any part of his body  Patient denies any preceding palpitations, chest pain, shortness of breath  Patient believes that he may have drank a bit less water today than he typically does and was working outside in the yard for a large part of the day  Patient otherwise states that he feels well and has not had any recent medical issues  Prior to Admission Medications   Prescriptions Last Dose Informant Patient Reported? Taking? Calcium Carbonate-Vitamin D 600-400 MG-UNIT per tablet   Yes No   Sig: Take 1 tablet by mouth 2 (two) times a day   Multiple Vitamin (multivitamin) tablet   Yes No   Sig: Take 1 tablet by mouth daily   amLODIPine (NORVASC) 5 mg tablet   Yes No   Sig: Take 5 mg by mouth in the morning     levETIRAcetam (Keppra) 1000 MG tablet   No No   Sig: Take 1 tablet (1,000 mg total) by mouth 2 (two) times a day   levothyroxine 200 mcg tablet   Yes No   Sig: Take 200 mcg by mouth daily   lisinopril (ZESTRIL) 20 mg tablet   Yes No   Sig: Take 20 mg by mouth daily   rosuvastatin (CRESTOR) 20 MG tablet   Yes No   Sig: Take 20 mg by mouth in the morning  Facility-Administered Medications: None       Past Medical History:   Diagnosis Date   • Head injury        Past Surgical History:   Procedure Laterality Date   • ROTATOR CUFF REPAIR Bilateral    • TONSILLECTOMY         Family History   Problem Relation Age of Onset   • Diabetes Father      I have reviewed and agree with the history as documented  E-Cigarette/Vaping   • E-Cigarette Use Never User      E-Cigarette/Vaping Substances   • Nicotine No    • THC No    • CBD No    • Flavoring No    • Other No    • Unknown No      Social History     Tobacco Use   • Smoking status: Former   • Smokeless tobacco: Never   • Tobacco comments:     quit around 2005   Vaping Use   • Vaping Use: Never used   Substance Use Topics   • Alcohol use: Not Currently   • Drug use: Never       Review of Systems   Constitutional: Negative for chills, fatigue and fever  Respiratory: Negative for cough and shortness of breath  Cardiovascular: Negative for chest pain  Gastrointestinal: Negative for abdominal pain, nausea and vomiting  Musculoskeletal: Negative for arthralgias, back pain, neck pain and neck stiffness  Neurological: Positive for syncope and light-headedness  Negative for dizziness and weakness  All other systems reviewed and are negative  Physical Exam  Physical Exam  Vitals and nursing note reviewed  Constitutional:       General: He is not in acute distress  Appearance: He is well-developed  He is not diaphoretic  Comments: Well-appearing, nontoxic, nondistressed   HENT:      Head: Normocephalic and atraumatic  Comments: Moist mucous membranes    Slight abrasion on the occipital scalp  No scalp hematoma  No additional external signs of trauma  Pupils 3 mm bilaterally, reactive to light     Right Ear: External ear normal       Left Ear: External ear normal       Nose: Nose normal       Mouth/Throat:      Pharynx: No oropharyngeal exudate  Eyes:      Conjunctiva/sclera: Conjunctivae normal       Pupils: Pupils are equal, round, and reactive to light  Cardiovascular:      Rate and Rhythm: Normal rate and regular rhythm  Heart sounds: Normal heart sounds  No murmur heard  No friction rub  No gallop  Comments: Regular rate and rhythm, no murmurs rubs or gallops  Extremities warm and well-perfused without mottling  Pulmonary:      Effort: Pulmonary effort is normal  No respiratory distress  Breath sounds: Normal breath sounds  No wheezing  Comments: No increased work of breathing  Speaking complete sentences  Satting 95% on room air indicating adequate oxygenation  Lungs clear to auscultation bilaterally without wheezes, rales, rhonchi  Chest:      Chest wall: No tenderness  Abdominal:      General: Bowel sounds are normal  There is no distension  Palpations: Abdomen is soft  There is no mass  Tenderness: There is no abdominal tenderness  There is no guarding or rebound  Musculoskeletal:         General: No deformity  Comments: No C/T/L-spine tenderness, step-off, deformity  Moving all extremities equally  Skin:     General: Skin is warm and dry  Capillary Refill: Capillary refill takes less than 2 seconds  Neurological:      Mental Status: He is alert and oriented to person, place, and time  Comments: AAOx3  Pleasant, interactive  Cranial nerves II through XII intact  Full strength and sensation bilaterally in the upper and lower extremities     Psychiatric:         Behavior: Behavior normal          Vital Signs  ED Triage Vitals [03/21/23 1825]   Temperature Pulse Respirations Blood Pressure SpO2   98 8 °F (37 1 °C) 99 18 131/82 93 %      Temp Source Heart Rate Source Patient Position - Orthostatic VS BP Location FiO2 (%)   Tympanic Monitor Sitting Right arm --      Pain Score       No Pain           Vitals:    03/21/23 1825   BP: 131/82   Pulse: 99   Patient Position - Orthostatic VS: Sitting         Visual Acuity  Visual Acuity    Flowsheet Row Most Recent Value   L Pupil Size (mm) 3   R Pupil Size (mm) 3          ED Medications  Medications - No data to display    Diagnostic Studies  Results Reviewed     Procedure Component Value Units Date/Time    HS Troponin 0hr (reflex protocol) [[id number]]  (Normal) Collected: 03/21/23 1849    Lab Status: Final result Specimen: Blood from Arm, Left Updated: 03/21/23 1919     hs TnI 0hr 4 ng/L     HS Troponin I 2hr [[id number]]     Lab Status: No result Specimen: Blood     Comprehensive metabolic panel [[id number]] Collected: 03/21/23 1849    Lab Status: Final result Specimen: Blood from Arm, Left Updated: 03/21/23 1911     Sodium 136 mmol/L      Potassium 3 8 mmol/L      Chloride 103 mmol/L      CO2 24 mmol/L      ANION GAP 9 mmol/L      BUN 13 mg/dL      Creatinine 0 91 mg/dL      Glucose 106 mg/dL      Calcium 9 4 mg/dL      AST 26 U/L      ALT 27 U/L      Alkaline Phosphatase 71 U/L      Total Protein 7 4 g/dL      Albumin 4 4 g/dL      Total Bilirubin 0 54 mg/dL      eGFR 87 ml/min/1 73sq m     Narrative:      Samuel guidelines for Chronic Kidney Disease (CKD):   •  Stage 1 with normal or high GFR (GFR > 90 mL/min/1 73 square meters)  •  Stage 2 Mild CKD (GFR = 60-89 mL/min/1 73 square meters)  •  Stage 3A Moderate CKD (GFR = 45-59 mL/min/1 73 square meters)  •  Stage 3B Moderate CKD (GFR = 30-44 mL/min/1 73 square meters)  •  Stage 4 Severe CKD (GFR = 15-29 mL/min/1 73 square meters)  •  Stage 5 End Stage CKD (GFR <15 mL/min/1 73 square meters)  Note: GFR calculation is accurate only with a steady state creatinine    CBC and differential [[id number]] Collected: 03/21/23 1849    Lab Status: Final result Specimen: Blood from Arm, Left Updated: 03/21/23 1854     WBC 8 89 Thousand/uL      RBC 4 82 Million/uL      Hemoglobin 14 8 g/dL      Hematocrit 44 4 %      MCV 92 fL      MCH 30 7 pg      MCHC 33 3 g/dL      RDW 12 4 %      MPV 9 9 fL      Platelets 419 Thousands/uL      nRBC 0 /100 WBCs      Neutrophils Relative 59 %      Immat GRANS % 1 %      Lymphocytes Relative 28 %      Monocytes Relative 9 %      Eosinophils Relative 2 %      Basophils Relative 1 %      Neutrophils Absolute 5 35 Thousands/µL      Immature Grans Absolute 0 05 Thousand/uL      Lymphocytes Absolute 2 45 Thousands/µL      Monocytes Absolute 0 84 Thousand/µL      Eosinophils Absolute 0 14 Thousand/µL      Basophils Absolute 0 06 Thousands/µL                  XR chest 1 view portable    (Results Pending)              Procedures  Procedures         ED Course                                             Medical Decision Making  I obtained history from the patient and from the patient's wife  Patient with syncopal episode preceded by warmth, lightheadedness, need to urinate  Patient with no seizure-like activity  Patient immediately regained normal mental status  Patient initially in a cervical collar  Patient with no midline cervical tenderness and able to be cleared clinically Via Nexus criteria and cervical collar was removed  Patient offered a CT head however without a significant headache, with a normal neurologic exam, no nausea, vomiting, and ultimately deferred CT head  I ordered and reviewed labs including CBC, CMP, troponin to evaluate for electrolyte or renal derangement, anemia, elevated troponin to suggest cardiac strain  Patient with unremarkable lab evaluation  I ordered and independently interpreted an EKG to evaluate primarily for arrhythmia  Patient in normal sinus rhythm rate of 89 without ST or T wave abnormalities    Patient with no additional worsening of symptoms in the emergency department  Patient's tetanus up-to-date  Provided with return precautions, instructions to follow-up with primary care provider, discharged  Amount and/or Complexity of Data Reviewed  Radiology: ordered  Disposition  Final diagnoses:   Syncope   Abrasion of scalp, initial encounter     Time reflects when diagnosis was documented in both MDM as applicable and the Disposition within this note     Time User Action Codes Description Comment    3/21/2023  7:21 PM Anikagreyson Figueroa Add [R55] Syncope     3/21/2023  7:22 PM Anikagreyson Figueroa Add [S00 01XA] Abrasion of scalp, initial encounter       ED Disposition     ED Disposition   Discharge    Condition   Stable    Date/Time   Tue Mar 21, 2023  7:21 PM    Comment   [address]  discharge to home/self care                 Follow-up Information     Follow up With Specialties Details Why Contact Info Additional Information    [facility] Emergency Department Emergency Medicine  If symptoms worsen [address] [address]  [address] [address] Emergency Department, [address], [address] Pillo Garcia MD Internal Medicine In 1 week  [address] [place] [address]  [address] [place] [address]  [phone]             Discharge Medication List as of 3/21/2023  7:23 PM      CONTINUE these medications which have NOT CHANGED    Details   amLODIPine (NORVASC) 5 mg tablet Take 5 mg by mouth in the morning , Historical Med      Calcium Carbonate-Vitamin D 600-400 MG-UNIT per tablet Take 1 tablet by mouth 2 (two) times a day, Historical Med      levETIRAcetam (Keppra) 1000 MG tablet Take 1 tablet (1,000 mg total) by mouth 2 (two) times a day, Starting Wed 12/7/2022, Normal      levothyroxine 200 mcg tablet Take 200 mcg by mouth daily, Historical Med      lisinopril (ZESTRIL) 20 mg tablet Take 20 mg by mouth daily, Historical Med      Multiple Vitamin (multivitamin) tablet Take 1 tablet by mouth daily, Historical Med      rosuvastatin (CRESTOR) 20 MG tablet Take 20 mg by mouth in the morning , Historical Med             No discharge procedures on file      PDMP Review       Value Time User    PDMP Reviewed  Yes 7/25/2022 11:43 AM Emmy García MD          ED Provider  Electronically Signed by           Urvashi Sanchez MD  03/21/23 5428

## 2023-04-26 ENCOUNTER — HOSPITAL ENCOUNTER (OUTPATIENT)
Dept: NEUROLOGY | Facility: HOSPITAL | Age: 68
Discharge: HOME | End: 2023-04-26
Attending: PSYCHIATRY & NEUROLOGY

## 2023-04-26 DIAGNOSIS — G40.209 LOCALIZATION-RELATED FOCAL EPILEPSY WITH COMPLEX PARTIAL SEIZURES (HCC): ICD-10-CM

## 2023-05-01 ENCOUNTER — APPOINTMENT (OUTPATIENT)
Dept: LAB | Facility: HOSPITAL | Age: 68
End: 2023-05-01
Attending: PSYCHIATRY & NEUROLOGY

## 2023-05-01 DIAGNOSIS — G40.209 LOCALIZATION-RELATED FOCAL EPILEPSY WITH COMPLEX PARTIAL SEIZURES (HCC): ICD-10-CM

## 2023-05-03 LAB — LEVETIRACETAM SERPL-MCNC: 31.1 UG/ML (ref 10–40)

## 2023-05-05 ENCOUNTER — TELEPHONE (OUTPATIENT)
Dept: NEUROLOGY | Facility: CLINIC | Age: 68
End: 2023-05-05

## 2023-05-05 NOTE — TELEPHONE ENCOUNTER
----- Message from Ulises Pratt MD sent at 5/3/2023  1:13 PM EDT -----  Levetiracetam level is better with increase dose to 1500mg twice a day  EEG study showed bilateral temporal slowing, no epileptiform discharges, but does not rule out seizures  Will review findings next week

## 2023-05-10 ENCOUNTER — OFFICE VISIT (OUTPATIENT)
Dept: NEUROLOGY | Facility: CLINIC | Age: 68
End: 2023-05-10

## 2023-05-10 VITALS
WEIGHT: 220 LBS | DIASTOLIC BLOOD PRESSURE: 75 MMHG | HEIGHT: 73 IN | BODY MASS INDEX: 29.16 KG/M2 | SYSTOLIC BLOOD PRESSURE: 119 MMHG | HEART RATE: 96 BPM

## 2023-05-10 DIAGNOSIS — R55 BLACK-OUT (NOT AMNESIA): ICD-10-CM

## 2023-05-10 DIAGNOSIS — G40.209 LOCALIZATION-RELATED FOCAL EPILEPSY WITH COMPLEX PARTIAL SEIZURES (HCC): Primary | ICD-10-CM

## 2023-05-10 PROBLEM — M83.5 ANTICONVULSANT DRUG-INDUCED BONE SOFTENING: Status: RESOLVED | Noted: 2021-11-17 | Resolved: 2023-05-10

## 2023-05-10 PROBLEM — T42.75XA ANTICONVULSANT DRUG-INDUCED BONE SOFTENING: Status: RESOLVED | Noted: 2021-11-17 | Resolved: 2023-05-10

## 2023-05-10 PROBLEM — E87.1 HYPONATREMIA: Status: RESOLVED | Noted: 2021-11-17 | Resolved: 2023-05-10

## 2023-05-10 RX ORDER — LEVETIRACETAM 1000 MG/1
2000 TABLET ORAL 2 TIMES DAILY
Qty: 360 TABLET | Refills: 3 | Status: SHIPPED | OUTPATIENT
Start: 2023-05-10

## 2023-05-10 RX ORDER — LEVOTHYROXINE SODIUM 175 UG/1
175 TABLET ORAL EVERY MORNING
COMMUNITY
Start: 2023-04-10

## 2023-05-10 NOTE — PATIENT INSTRUCTIONS
Plan:   1 - increase Levetiracetam to 2000mg twice a day  2 - MRI brain w/wo contrast and MRA of head to assess for abnormal vertebrobasilar narrow  3 - check levetiracetam level and BMP one day prior to MRI imaging study  4 - call the office if you are still experiencing these auras of feeling hot in the face, let us know the frequency of these episodes, will request a 72 hours ambulatory EEG study to assess for seizures  If you have more black outs will request and inpatient epilepsy monitoring unit (EMU) study for better characterization of these events  Follow-up in 3 months    Video Electroencephalopathy (VEEG) and the Epilepsy Monitoring Unit (EMU)    What is video EEG? In video-EEG, you are video taped at the same time as your brain waves are recorded  This typically occurs in the hospital over a long period of time, often an average of 3-7 days  The doctor is able to review both the video and EEG at the same time to see exactly what your brain waves are doing while watching what your body is doing  Why do I need video-EEG? Typically the reasons to have a video-EEG study are to make a diagnosis, classify the type of seizures and epilepsy, and if medications do not prevent the seizures then offer an alternative therapy (pre-surgical evaluation)  Some people have events that look like an epileptic seizure (caused by electrical storm of the brain); but are in fact not due to abnormal electrical activity in the brain  Other causes include cardiac or vascular pathology, other neurological causes such as tics and movement disorders, or psychological / psychogenic nonepileptic events  There are also many different kinds of epileptic seizures  The video EEG will help classify the seizures and epilepsy syndrome to determine the best medications or treatments     Nearly a third of epilepsy patients are medically refractory (failed 2 or more appropriate anti-seizure medications) and epilepsy surgery may be an option for these patients  VEEG is used to evaluate where seizures start in the brain, determine if surgery may be possible and guide the specific surgical approach for patients who are found to be surgical candidates  What to expect in the Epilepsy Monitoring Unit (EMU)  The EMU is a specialized inpatient unit in the [facility] specially designed for evaluation of seizure disorders  The unit is equipped with computer-based monitoring equipment, certified EEG technologists, trained nurses, and attending physicians trained in the management of epilepsy  In the EMU, seizures are recorded and specially reviewed so that a proper diagnosis can be made and treatment can be optimized  Each patient will have a private room and a private bathroom  Patients will be connected to video-EEG equipment continuously (24 hours a day)  There is no video recording while in the restroom, but brain waves are recorded on the EEG at all times  Although this can be bothersome, continuous monitoring at all times is necessary to make sure patients are safe and that the necessary information is collected  Because patients are connected to recording equipment, mobility is restricted to the patient’s room  Patients are not be able to take a shower or wash your hair while on EEG monitoring  This would interfere with your EEG electrodes  Washing with a basin or sink is permitted  Patients are asked to activate a push button when they experience an event and then state aloud what they are experiencing  Nurses will test the patient during an event to help the doctors see what is occurring  To increase the chance of capturing a seizure in a limited amount of time a variety of “activation procedures” are employed    Sleep deprivation (no naps during the day and attempts to keep you awake all night as often as every other night)  Photic stimulation and hyperventilation procedures  Weaning or withdrawal of medications used to control seizures (the attending physician will discuss this with you)  You will need to have a peripheral IV placed in your hand or arm  This allows the doctors to give “rescue” medications in the event of a medical emergency while you are in the hospital  You also may be given a blood thinner in the form of daily subcutaneous injections to prevent deep venous thrombosis (DVT)  The [facility] is a non-smoking facility, therefore smoking is not allowed  Chewing gum is not allowed, this creates artifact on the EEG  If you are a woman of childbearing age, you may be asked to take a urine pregnancy test   We strive to make the EMU as safe as possible  Throughout the world video EEG monitoring is the standard of care and thousands of patients have been admitted to epilepsy monitoring units and there are rare cases of complications due to severely difficult to control epilepsy  These have included seizure clusters, status epilepticus (seizures that do not stop with typical medications and advanced measures are required), injuries (fractures and head injury), and very rarely death  How long do patients stay in the EMU? The length of time varies for each patient  Prepare to stay about 1 week (even though it may not last that long)  Typically, patients stay between 3-7 days, but may stay more or less time in special circumstances  Things you can do to prepare for admission  Take a shower and wash your hair the night before or the morning of admission  Do not use any hair products such as gels, sprays, mousse, or hair weaves  It is NOT necessary to cut your hair or shave your head for the test    Unless you received specific instructions from your physician, continue to take your medications as you normally do, including the morning of your admission    Bring all of your current medications in the original prescription bottles to the hospital (some specialty medications may not be available in the hospital)  Bring a complete list of your medical and surgical history  Bring your seizure calendar  Wear comfortable clothing or pajamas  Button-down shirts and elastic-waist pants are preferred  You can bring slippers or sneakers for when you are walking around the room  You can bring activities like computers, phones, cards, music, movies, etc  with you to keep you occupied  Electronic devices cannot be plugged in while you are touching them (this interferes with the equipment), but can be charging on the other side of the room     If you need any medical devices (such as a CPAP for obstructive sleep apnea), please bring it to the hospital

## 2023-05-10 NOTE — PROGRESS NOTES
Patient ID: Caesar Marina is a 80 y o  male  Assessment/Plan:    No problem-specific Assessment & Plan notes found for this encounter  {Assess/PlanSmartLinks:42452}       Subjective:    HPI    {[facility] HPI texts:67177}    {Common ambulatory SmartLinks:68118}         Objective:    Blood pressure 119/75  Physical Exam    Neurological Exam      ROS:    Review of Systems   Constitutional: Negative  Negative for appetite change and fever  HENT: Negative  Negative for hearing loss, tinnitus, trouble swallowing and voice change  Eyes: Negative  Negative for photophobia, pain and visual disturbance  Respiratory: Negative  Negative for shortness of breath  Cardiovascular: Negative  Negative for palpitations  Gastrointestinal: Negative  Negative for nausea and vomiting  Endocrine: Negative  Negative for cold intolerance  Genitourinary: Negative  Negative for dysuria, frequency and urgency  Musculoskeletal: Negative  Negative for gait problem, myalgias and neck pain  Skin: Negative  Negative for rash  Allergic/Immunologic: Negative  Neurological: Negative  Negative for dizziness, tremors, seizures, syncope, facial asymmetry, speech difficulty, weakness, light-headedness, numbness and headaches  Hematological: Negative  Does not bruise/bleed easily  Psychiatric/Behavioral: Negative  Negative for confusion, hallucinations and sleep disturbance  All other systems reviewed and are negative

## 2023-05-10 NOTE — PROGRESS NOTES
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  He has multiple areas of cortical encephalomalacia over the bilateral frontal and anterior temporal regions along with posterior frontal or anterior parietal regions  We transitioned him off of carbamazepine due to hyponatremia  We had him on a reasonable (but not maximal) dose of levetiracetam   However, it seems that he had a couple of focal impaired aware seizures on levetiracetam   Instead of going back on carbamazepine, I recommend that we maximize his levetiracetam dose before considering it a failed medication trial   If he continues to have seizures on maximal dose of levetiracetam then we should transition him to lacosamide (avoiding carbamazepine due to hyponatremia)  Due to seizures, he needs an updated MRI brain imaging study to rule out new structural lesions  There were two episodes of him collapsing, loss of consciousness without witnessed convulsive or period of altered awareness  These could have been syncopal events rather than seizures  We should also obtain an MRA of the head to rule out focal stenosis of the vertebrobasilar system  Plan:   1 - increase Levetiracetam to 2000mg twice a day  2 - MRI brain w/wo contrast and MRA of head to assess for abnormal vertebrobasilar narrow  3 - check levetiracetam level and BMP one day prior to MRI imaging study  4 - call the office if you are still experiencing these auras of feeling hot in the face, let us know the frequency of these episodes, will request a 72 hours ambulatory EEG study to assess for seizures  If you have more black outs will request and inpatient epilepsy monitoring unit (EMU) study for better characterization of these events       Follow-up in 3 months "with Advanced Practitioner      Problem List Items Addressed This Visit        Nervous and Auditory    Localization-related focal epilepsy with complex partial seizures ([facility] [address] ) - Primary    Relevant Medications    levETIRAcetam (Keppra) 1000 MG tablet    Other Relevant Orders    Levetiracetam level    Basic metabolic panel    MRI brain seizure wo and w contrast    MRI angiogram head without contrast       Other    Black-out (not amnesia)    Relevant Orders    MRI brain seizure wo and w contrast    MRI angiogram head without contrast       Chief Complaint:    Chief Complaint   Patient presents with   • Seizures      HPI:    Joaquin Quick is a 80 y o  right handed male here for follow-up evaluation of seizure disorder with recent black outs  Interval History 5/10/2023  On 3/21/2023 - ED visit for passing out  These passing out events started about a month prior to the ED visit  His wife described that he would suddenly stop in his tracks, say \"Oh shit\", then he goes to the ground with a blank stare  He will say his name but look at her and say \"what? \"  He has no recollection of what happened  He may appear to be unresponsive for 1-2 minutes but snaps out of it  Francis Corporal recalled going to the hospital, he recalled saying \"oh shit\" and feeling warm all over his face (flushed), then he blacked out  His wife has seen about 3 of these events/seizures  She recalled walking around the neighborhood, he stopped said \"oh shit\", he said he was not feeling right, he collapsed on her, eyes closed, then woke up within a few seconds  He was not out for a long time, there was no convulsion  There was an episode that happened in the shower, he just blacked out  She heard a thump, found him sitting out of the tub, he was already conscious, no physical injury  Since increasing the dose of the Levetiracetam, he has not noticed any further black outs or seizures and feeling clearer    There were maybe three other " "episodes of feeling flushed, warm in his face, lasted about 30 seconds, and it went away, without loss of consciousness or black out  AED/side effects/compliance:  Levetiracetam [id number]  No visible side effects    Event/Seizure semiology:  1 2023 - possible focal impaired aware seizure - reported a feeling warm, then blacked out, blank staring      Prior Epilepsy History:  Intake History 11/17/2021  Records from Dr Zoë Snyder are limited with respect to epilepsy diagnosis  Reference to a head injury in 1996 resulting in seizures  Mr Ro Poe recalled that 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  He may have been in a coma  He may have hit the back of his head on the concrete curve, there was a fracture and there was an injury (area of \"dead brain\")  It was about 6 months later he had an episode  He recalled he was at work, then woke up in the hospital   He had no recollection what happened  His wife recalled that Ann-Marie Kaba was being taken to the hospital, but no information was provided to him  He does not what was the diagnosis  He went to see a neurologist (but does not remember the name of the doctor)  He had a 24 hours ambulatory EEG study  He was put on carbamazepine 200-200, to be on the safe side, so that he would never have another episode  He was never given a formal diagnosis  He has been seen by Dr Zoë Snyder ever since this episode happened  He has not seen a neurologist for years  The patient denies any history of myoclonus, staring spells, automatisms, unexplained hyperkinetic behaviors, olfactory / gustatory hallucinations, epigastric rising events, susan vu events, visual hallucinations, unexplained nocturnal enuresis, or nocturnal tongue biting      Summary 2022  -750, CBZ " 200-200  He started levetiracetam titrated to 750-750  There has been no recurrent seizure since   We weaned him off of carbamazepine and increased his levetiracetam dose to 1000mg twice a day  Special Features  Status epilepticus: No  Self Injury Seizures: No  Precipitating Factors: None  Post-ictal state: None    Epilepsy Risk Factors:  Abnormal pregnancy: No  Abnormal birth/: No  Abnormal Development: No  Febrile seizures, simple: No  Febrile seizures, complex: No  CNS infection: No  Mental retardation: No  Cerebral palsy: No  Head injury (moderate/severe): Yes -  fell from a ladder with severe head injury, coma  CNS neoplasm: No  CNS malformation: No  Neurosurgical procedure: No  Stroke: No  CNS autoimmune disorder: No  Alcohol abuse: No  Drug abuse: No  Family history Sz/epilepsy: No    Prior AEDs:  medication Time used Reason to stop   carbamazepine ?-now hyponatremia   levetiracetam -now           Prior workup:  x  Imagin2015 - MRI brain study  There are multifocal regions of encephalomalacia and gliosis  The most significant ones are right more than left anterior frontal lobe involvement (quite extensive T2 hyperintensity on the right extending down the white matter to the frontal horn of the lateral ventricle), right more than left anterior temporal pole, and specific gyri in the bilateral posterior lateral frontal lobes (anterior to the precentral gyrus), and one gyrus on the left parietal region      2021 - DEXA  Consistent with osteoporosis  T score -2 2 (right and left femoral neck)  T score -2 7 (lumbar spine)    EEGs:  2021 - Normal awake study  2023 - right more than left polymorphic delta activity; few right sharp transients    Labs:  Component      Latest Ref Rng & Units 3/21/2023 2023 2023   WBC      4 31 - 10 16 Thousand/uL 8 89 6 69    Red Blood Cell Count      3 88 - 5 62 Million/uL 4 82 4 90    Hemoglobin      12 0 - 17 0 g/dL "14 8 15 2    HCT      36 5 - 49 3 % 44 4 44 7    Platelet Count      187 - 390 Thousands/uL 279 303    Sodium      135 - 147 mmol/L 136 138    Potassium      3 5 - 5 3 mmol/L 3 8 4 0    Chloride      96 - 108 mmol/L 103 105    CO2      21 - 32 mmol/L 24 24    Anion Gap      4 - 13 mmol/L 9 9    BUN      5 - 25 mg/dL 13 12    Creatinine      0 60 - 1 30 mg/dL 0 91 0 89    Glucose, Random      65 - 140 mg/dL 106     Calcium      8 4 - 10 2 mg/dL 9 4 10 1    AST      13 - 39 U/L 26 22    ALT      7 - 52 U/L 27 24    Alkaline Phosphatase      34 - 104 U/L 71 77    Total Protein      6 4 - 8 4 g/dL 7 4 7 8    Albumin      3 5 - 5 0 g/dL 4 4 4 3    TOTAL BILIRUBIN      0 20 - 1 00 mg/dL 0 54 1 01 (H)    eGFR      ml/min/1 73sq m 87 89    GLUCOSE FASTING      65 - 99 mg/dL  94    LEVETIRACETA (KEPPRA)      10 0 - 40 0 ug/mL  11 7 31 1     General exam   /75 (BP Location: Right arm, Patient Position: Sitting, Cuff Size: Standard)   Pulse 96   Ht 6' 1\" (1 854 m)   Wt 99 8 kg (220 lb)   BMI 29 03 kg/m²    Appearance: normally developed, appears well  Carotids: not assessed  Cardiovascular: regular rate and rhythm and normal heart sounds  Pulmonary: clear to auscultation   Extremities: no edema    HEENT: anicteric and moist mucus membranes / oral cavity   Fundoscopy: not assessed    Mental status  Orientation: alert and oriented to name, place, time  Fund of Knowledge: intact   Attention and Concentration: MONKEY - YEKUM (i got it wrong)  Current and Remote Memory:recalled 2/3 words after five minutes  Language: spontaneous speech is normal and comprehension is intact    Cranial Nerves  CN 1: not tested  CN 2: pupils equal round reactive to direct and consenual light   CN 3, 4, 6: EOMI, no nystagmus  CN 5:not assessed  CN 7:muscles of facial expression are symmetric  CN 8:not assessed  CN 9, 10:no dysarthria present  CN 11:symmetric shoulder shrug  CN 12:not assessed    Motor:  Bulk, Tone: normal bulk, normal " tone  Pronation: no pronator drift  Strength: Symmetric strength of the arms and legs, no lateralizing weakness   Abnormal movements: no abnormal movements are present    Sensory:  Lighttouch: intact in all limbs  Romberg:not assessed    Coordination:  FNF:FNF bilaterally intact  IKE:incoordinated finger movements of the left and incoordinated finger movements of the right  FFM:intact  Gait/Station:normal gait    Reflexes:  Bilateral biceps and brachioradialis 2+/4  Bilateral triceps and knees 1+/4  Bilateral ankles 0/4    Past Medical/Surgical History:  Patient Active Problem List   Diagnosis   • Primary hypertension   • Other specified hypothyroidism   • Other hyperlipidemia   • Localization-related focal epilepsy with complex partial seizures ([facility] [address] )   • Traumatic brain injury ([facility] [address] )   • Black-out (not amnesia)     Past Surgical History:   Procedure Laterality Date   • ROTATOR CUFF REPAIR Bilateral    • TONSILLECTOMY       Past Psychiatric History:  Depression: No  Anxiety: No  Psychosis: No    Medications:    Current Outpatient Medications:   •  amLODIPine (NORVASC) 5 mg tablet, Take 5 mg by mouth in the morning , Disp: , Rfl:   •  Calcium Carbonate-Vitamin D 600-400 MG-UNIT per tablet, Take 1 tablet by mouth 2 (two) times a day, Disp: , Rfl:   •  levETIRAcetam (Keppra) 1000 MG tablet, Take 2 tablets (2,000 mg total) by mouth 2 (two) times a day, Disp: 360 tablet, Rfl: 3  •  lisinopril (ZESTRIL) 20 mg tablet, Take 20 mg by mouth daily, Disp: , Rfl:   •  Multiple Vitamin (multivitamin) tablet, Take 1 tablet by mouth daily, Disp: , Rfl:   •  rosuvastatin (CRESTOR) 20 MG tablet, Take 20 mg by mouth in the morning , Disp: , Rfl:   •  levothyroxine 175 mcg tablet, Take 175 mcg by mouth every morning, Disp: , Rfl:     Allergies:  No Known Allergies    Family history:  Family History   Problem Relation Age of Onset   • Diabetes Father      There is no family history of seizure, epilepsy or developmental delay        Social History  Living situation:  Lives with spouse  Work:  Retired cable construction, 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); completed high school  Driving:  Suspended, he present a few [place] DM forms for completion   reports that he has quit smoking  He has never used smokeless tobacco  He reports that he does not currently use alcohol  He reports that he does not use drugs  Review of Systems  A review of at least 12 organ/systems was obtained by the medical assistant and reviewed by me, including additional positives/negatives:  A review of at least 12 organ/systems was evaluated and there are no complaints  Decision making was of high-complexity due to the patient's high risk condition (seizures), psychiatric and neuropsychological comorbidities, behavioral problems, memory and cognitive problems and medication side effects  The total amount of time spent with the patient along with pre-chart and post-chart preparation was 50 minutes on the calendar day of the date of service  This included history taking, physical exam, review of ancillary testing, counseling provided to the patient regarding diagnosis, medications, treatment, and risk management, and other communication to the patient's providers and/or family    Start time: 11AM  End time: 11:50AM

## 2023-05-30 ENCOUNTER — APPOINTMENT (OUTPATIENT)
Dept: LAB | Facility: HOSPITAL | Age: 68
End: 2023-05-30
Attending: PSYCHIATRY & NEUROLOGY

## 2023-05-30 DIAGNOSIS — G40.209 LOCALIZATION-RELATED FOCAL EPILEPSY WITH COMPLEX PARTIAL SEIZURES (HCC): ICD-10-CM

## 2023-05-30 LAB
ANION GAP SERPL CALCULATED.3IONS-SCNC: 7 MMOL/L (ref 4–13)
BUN SERPL-MCNC: 10 MG/DL (ref 5–25)
CALCIUM SERPL-MCNC: 9.8 MG/DL (ref 8.4–10.2)
CHLORIDE SERPL-SCNC: 104 MMOL/L (ref 96–108)
CO2 SERPL-SCNC: 25 MMOL/L (ref 21–32)
CREAT SERPL-MCNC: 0.94 MG/DL (ref 0.6–1.3)
GFR SERPL CREATININE-BSD FRML MDRD: 83 ML/MIN/1.73SQ M
GLUCOSE P FAST SERPL-MCNC: 102 MG/DL (ref 65–99)
POTASSIUM SERPL-SCNC: 3.9 MMOL/L (ref 3.5–5.3)
SODIUM SERPL-SCNC: 136 MMOL/L (ref 135–147)

## 2023-05-31 ENCOUNTER — HOSPITAL ENCOUNTER (OUTPATIENT)
Dept: RADIOLOGY | Age: 68
Discharge: HOME | End: 2023-05-31

## 2023-05-31 DIAGNOSIS — G40.209 LOCALIZATION-RELATED FOCAL EPILEPSY WITH COMPLEX PARTIAL SEIZURES (HCC): ICD-10-CM

## 2023-05-31 DIAGNOSIS — R55 BLACK-OUT (NOT AMNESIA): ICD-10-CM

## 2023-05-31 RX ADMIN — GADOBUTROL 10 ML: 604.72 INJECTION INTRAVENOUS at 10:43

## 2023-06-01 LAB — LEVETIRACETAM SERPL-MCNC: 16 UG/ML (ref 10–40)

## 2023-06-06 ENCOUNTER — TELEPHONE (OUTPATIENT)
Dept: NEUROLOGY | Facility: CLINIC | Age: 68
End: 2023-06-06

## 2023-06-06 DIAGNOSIS — G40.209 LOCALIZATION-RELATED FOCAL EPILEPSY WITH COMPLEX PARTIAL SEIZURES (HCC): Primary | ICD-10-CM

## 2023-06-06 NOTE — TELEPHONE ENCOUNTER
If this recent levetiracetam level was drawn prior to his next dose then it would represent a trough level  Please have him repeat levetiracetam level but this time get blood work done about 2-4 hours after taking his morning dose of medication  MRI brain study - no new structural abnormality; prior areas of TBI are unchanged from before (TBI at the bilateral anterior and inferior frontal lobes, bilateral temporal lobes, bilateral posterior frontal-parietal regions  Paulette Valencia

## 2023-06-06 NOTE — TELEPHONE ENCOUNTER
----- Message from John Pichardo MD sent at 6/5/2023  4:50 PM EDT -----  Despite levetiracetam dose increased to 2000mg twice a day; his levetiracetam level is still low but actually lower than his prior level from 5/1/2023  Did he take his morning dose of medication or did his hold morning dose until after blood work?

## 2023-06-06 NOTE — TELEPHONE ENCOUNTER
Pt isn't 100% sure, but fairly certain he took his Levetiracetam after the labs were drawn  Please advise  Thank you

## 2023-06-15 ENCOUNTER — TELEPHONE (OUTPATIENT)
Dept: NEUROLOGY | Facility: CLINIC | Age: 68
End: 2023-06-15

## 2023-06-16 NOTE — TELEPHONE ENCOUNTER
Spoke to patient  Advised of MRI and lab results  Verbalized understanding  Requesting lab script printed and mailed to him

## 2023-06-16 NOTE — TELEPHONE ENCOUNTER
Patient called and would like a call back on his cell phone with the results from his scans      #[phone]

## 2023-06-21 ENCOUNTER — APPOINTMENT (OUTPATIENT)
Dept: LAB | Facility: HOSPITAL | Age: 68
End: 2023-06-21
Attending: PSYCHIATRY & NEUROLOGY
Payer: COMMERCIAL

## 2023-06-21 DIAGNOSIS — G40.209 LOCALIZATION-RELATED FOCAL EPILEPSY WITH COMPLEX PARTIAL SEIZURES (HCC): ICD-10-CM

## 2023-06-21 PROCEDURE — 80177 DRUG SCRN QUAN LEVETIRACETAM: CPT

## 2023-06-21 PROCEDURE — 36415 COLL VENOUS BLD VENIPUNCTURE: CPT

## 2023-06-23 ENCOUNTER — TELEPHONE (OUTPATIENT)
Dept: NEUROLOGY | Facility: CLINIC | Age: 68
End: 2023-06-23

## 2023-06-26 LAB — LEVETIRACETAM SERPL-MCNC: 49.3 UG/ML (ref 10–40)

## 2023-07-03 DIAGNOSIS — G40.209 LOCALIZATION-RELATED FOCAL EPILEPSY WITH COMPLEX PARTIAL SEIZURES (HCC): ICD-10-CM

## 2023-07-03 NOTE — TELEPHONE ENCOUNTER
Patient needs a refill on   levETIRAcetam (Keppra) 1000 MG tablet    Sent to the [facility] on [address] [facility]. Has 6 days left and Is worried it wont get to him in time with the mail order.

## 2023-07-05 RX ORDER — LEVETIRACETAM 1000 MG/1
2000 TABLET ORAL EVERY 12 HOURS
Qty: 40 TABLET | Refills: 0 | Status: SHIPPED | OUTPATIENT
Start: 2023-07-05

## 2023-07-05 RX ORDER — LEVETIRACETAM 1000 MG/1
2000 TABLET ORAL 2 TIMES DAILY
Qty: 360 TABLET | Refills: 3 | Status: CANCELLED | OUTPATIENT
Start: 2023-07-05

## 2023-07-05 NOTE — TELEPHONE ENCOUNTER
May script sent to [facility]. Patient is requesting alternate pharmacy. Please sign.

## 2023-07-05 NOTE — TELEPHONE ENCOUNTER
Is the refill request for a 90 day supply of Levetiracetam to [facility] or just a week until his mail order gets here?

## 2023-07-05 NOTE — TELEPHONE ENCOUNTER
I sent a separate Levetiracetam 1000mg tab script for 10 days supply to [facility] in [place] (keeping the [facility] script active so that it is not cancelled). Once received from [address] [place] [address] Lilia will cancel the limited supply script.

## 2023-07-07 ENCOUNTER — APPOINTMENT (OUTPATIENT)
Dept: LAB | Facility: HOSPITAL | Age: 68
End: 2023-07-07
Attending: INTERNAL MEDICINE
Payer: COMMERCIAL

## 2023-07-07 DIAGNOSIS — E03.9 MYXEDEMA HEART DISEASE: ICD-10-CM

## 2023-07-07 DIAGNOSIS — I51.9 MYXEDEMA HEART DISEASE: ICD-10-CM

## 2023-07-07 LAB — TSH SERPL DL<=0.05 MIU/L-ACNC: 0.32 UIU/ML (ref 0.45–4.5)

## 2023-07-07 PROCEDURE — 36415 COLL VENOUS BLD VENIPUNCTURE: CPT

## 2023-07-07 PROCEDURE — 84443 ASSAY THYROID STIM HORMONE: CPT

## 2023-07-21 ENCOUNTER — TELEPHONE (OUTPATIENT)
Dept: NEUROLOGY | Facility: CLINIC | Age: 68
End: 2023-07-21

## 2023-07-21 NOTE — TELEPHONE ENCOUNTER
Telephone call to patient and informed him that we completed and faxed another set of [place] DM[place] forms to [place] [employer]. Forms scanned into patients chart.  Patient stated that he recieved a new set of forms and will call me back when he finds them

## 2023-08-15 ENCOUNTER — OFFICE VISIT (OUTPATIENT)
Dept: NEUROLOGY | Facility: CLINIC | Age: 68
End: 2023-08-15
Payer: COMMERCIAL

## 2023-08-15 VITALS
WEIGHT: 213 LBS | DIASTOLIC BLOOD PRESSURE: 74 MMHG | SYSTOLIC BLOOD PRESSURE: 121 MMHG | HEIGHT: 73 IN | BODY MASS INDEX: 28.23 KG/M2 | HEART RATE: 106 BPM

## 2023-08-15 DIAGNOSIS — G40.209 LOCALIZATION-RELATED FOCAL EPILEPSY WITH COMPLEX PARTIAL SEIZURES (HCC): ICD-10-CM

## 2023-08-15 PROCEDURE — 99215 OFFICE O/P EST HI 40 MIN: CPT | Performed by: NURSE PRACTITIONER

## 2023-08-15 RX ORDER — LEVETIRACETAM 1000 MG/1
2000 TABLET ORAL 2 TIMES DAILY
Qty: 360 TABLET | Refills: 3 | Status: SHIPPED | OUTPATIENT
Start: 2023-08-15

## 2023-08-15 NOTE — PATIENT INSTRUCTIONS
- Continue current dose of levetiracetam 2000 mg twice per day  - Call the office with any breakthrough seizures issues or concerns  - Follow up in 4 months with Dr Martha Hunter   - blood work one week before

## 2023-08-15 NOTE — PROGRESS NOTES
Patient ID: Felipa Gottron is a 80 y.o. 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 Assessment/Plan:    Localization-related focal epilepsy with complex partial seizures ([address])  Patient has been free of events concerning for seizures or episodes of collapse with loss of consciousness since his last visit when levetiracetam was increased to 2000 mg BID. He denies any side effects or concerns associated with this medication or increasing his dose. His recent MRA of his head was normal. Recent MRI brain revealed  anterior frontal lobes and inferior frontal lobes bilaterally as well as the anterior temporal lobes bilaterally in right posterior inferior temporal lobe. Additional involvement of the subcortical right posterior frontal lobe and left frontoparietal junction are noted all of which are unchanged from the prior study. Recent EEG with R>L polymorphic delta activity and a few right sharp transients. Patient will continue with current dose of levetiracetam 2000 mg BID. If there are seizures in the future, consider second AED such as lacosamide. He will call the office with seizures or concerns. Follow up in 4 months or sooner if needed. Subjective:  Felipa Gottron is a 80 y.o. 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 He was last seen in the office by Dr Bernis Moritz on 5/10/2023. At that time, noted multiple areas of cortical encephalomalacia over the bilateral frontal and anterior temporal regions along with posterior frontal or anterior parietal regions. He was recently transitioned off of carbamazepine due to hyponatremia and onto levetiracetam. Prior to his last visit, there were several focal impaired aware seizures on levetiracetam so recommended increasing to 2000 mg BID instead of going back to oxcarbazepine.  If he continued to have seizures on maximal dose of levetiracetam, could consider adding lacosamide. Due to seizures recommended updated MRI brain to rule out structural lesions as well as an MRA due to episodes of him collapsing, loss of consciousness, without witnessed convulsions or altered awareness. His MRI/MRA were without acute findings after his visit, stable encephalomalacia. The patient presents to his appointment today with his wife. He is now on levetiracetam 2000 mg twice per day. He denies any side effects or concerns related to his medication. Overall he has been doing really well since his last visit. Current AEDs:  - levetiracetam 2000 mg BID  Other medications per Epic     Latest Reference Range & Units 23 09:35 23 09:32 23 09:03 23 13:18   LEVETIRACETA (KEPPRA) 10.0 - 40.0 ug/mL 11.7 31.1 16.0 49.3 (H)   (H): Data is abnormally high       Special Features  Status epilepticus: No  Self Injury Seizures: No  Precipitating Factors: None  Post-ictal state: None     Epilepsy Risk Factors:  Abnormal pregnancy: No  Abnormal birth/: No  Abnormal Development: No  Febrile seizures, simple: No  Febrile seizures, complex: No  CNS infection: No  Mental retardation: No  Cerebral palsy: No  Head injury (moderate/severe): Yes -  fell from a ladder with severe head injury, coma  CNS neoplasm: No  CNS malformation: No  Neurosurgical procedure: No  Stroke: No  CNS autoimmune disorder: No  Alcohol abuse: No  Drug abuse: No  Family history Sz/epilepsy: No     Prior AEDs:  medication Time used Reason to stop   carbamazepine ?-now hyponatremia   levetiracetam -now                Prior workup:  x  Imagin2015 - MRI brain study  There are multifocal regions of encephalomalacia and gliosis.   The most significant ones are right more than left anterior frontal lobe involvement (quite extensive T2 hyperintensity on the right extending down the white matter to the frontal horn of the lateral ventricle), right more than left anterior temporal pole, and specific gyri in the bilateral posterior lateral frontal lobes (anterior to the precentral gyrus), and one gyrus on the left parietal region.     5/31/2023 - MRA head  Normal MRA Brain. 5/31/23- MRI brain seizure w wo contrast  Encephalomalacia identified anterior frontal lobes and inferior frontal lobes bilaterally as well as the anterior temporal lobes bilaterally in right posterior inferior temporal lobe. Additional involvement of the subcortical right posterior frontal lobe and left frontoparietal junction are noted all of which are unchanged from the prior study. This pattern is typically seen in the setting of remote prior intracranial trauma with hemosiderin staining noted along the sulci of of the right frontal cortex. There is no mass, mass effect, or midline shift. No evidence of recent hemorrhage. Small scattered hyperintensities on T2/FLAIR imaging are noted in the periventricular and subcortical white matter demonstrating an appearance that   is statistically most likely to represent mild microangiopathic change. 12/27/2021 - DEXA  Consistent with osteoporosis  T score -2.2 (right and left femoral neck)  T score -2.7 (lumbar spine)     EEGs:  12/27/2021 - Normal awake study  4/26/2023 - right more than left polymorphic delta activity; few right sharp transients    Past Psychiatric History:  Depression: No  Anxiety: No  Psychosis: No    The following portions of the patient's history were reviewed and updated as appropriate: allergies, current medications, past family history, past medical history, past social history, past surgical history and problem list.         Objective:    Blood pressure 121/74, pulse (!) 106, height 6' 1" (1.854 m), weight 96.6 kg (213 lb). Physical Exam  No apparent distress. Appears well nourished.    Mood appropriate for situation     Neurologic Exam  Mental status- alert and oriented to person, place, and time. Speech appropriate for conversation. Good attention and knowledge. Cranial Nerves- PERRL, EOMS normal, facial sensation and muscles symmetric, hearing intact bilaterally to finger rubs, tongue midline, palate rise symmetrical, shoulder shrug symmetrical.    Motor- No pronator drift. Appropriate strength. Moves all extremities freely. No tremor. Sensory-  Intact distally in all extremities to light touch. DTRs- 2+ and symmetric in all extremities     Gait- normal casual gait. Normal tandem gait. Negative rhomberg. Coordination- FNF intact. ROS:    Review of Systems   Constitutional: Negative for appetite change, fatigue and fever. HENT: Negative. Negative for hearing loss, tinnitus, trouble swallowing and voice change. Eyes: Negative. Negative for photophobia, pain and visual disturbance. Respiratory: Negative. Negative for shortness of breath. Cardiovascular: Negative. Negative for palpitations. Gastrointestinal: Negative. Negative for nausea and vomiting. Endocrine: Negative. Negative for cold intolerance. Genitourinary: Negative. Negative for dysuria, frequency and urgency. Musculoskeletal: Negative for back pain, gait problem, myalgias and neck pain. Skin: Negative. Negative for rash. Allergic/Immunologic: Negative. Neurological: Negative. Negative for dizziness, tremors, seizures, syncope, facial asymmetry, speech difficulty, weakness, light-headedness, numbness and headaches. Hematological: Negative. Does not bruise/bleed easily. Psychiatric/Behavioral: Negative. Negative for confusion, hallucinations and sleep disturbance. All other systems reviewed and are negative. ROS obtained by MA and reviewed by myself.

## 2023-08-29 ENCOUNTER — TELEPHONE (OUTPATIENT)
Dept: NEUROLOGY | Facility: CLINIC | Age: 68
End: 2023-08-29

## 2023-08-29 NOTE — TELEPHONE ENCOUNTER
Alvaro cody, YOB: 1955 in regards to seizures that happened in the past. I had to surrender my 's license. I would like to know how to proceed to get my license back. You guys have to send in paperwork to the [facility] [employer] or how do I proceed with this? I would appreciate a call back as soon as possible. Thank you.

## 2023-08-29 NOTE — TELEPHONE ENCOUNTER
received vm from 9:08am-Rachid moses, in regards to my 's license for my seizures, I had to surrender to [employer]. I'm just wondering if you guys have to do any kind of paperwork for me to receive my license back after the 6 month period, which will be sometime in September. My birthday is April 16th 1955. If you could give me a call back at this number sometime today, I appreciate it. Thank you. Paulette.  -------------------------------------------------  Left message advising he contact [facility] to find out what forms are needed and they can either be brought to our office or faxed to [phone].

## 2023-09-01 NOTE — TELEPHONE ENCOUNTER
Patient left voicemail requesting call back regarding [facility] [place] forms to be completed. Call returned to patient, [phone]. States he faxed [facility] paperwork to our office for completion. Faxes not yet received. States he will follow up with office on Tuesday to verify they have been received.

## 2023-09-01 NOTE — TELEPHONE ENCOUNTER
9/1/23 AT [phone]    Yes, this is Bailee Hind calling to Ravindra Taylor in regards to paperwork that was faxed to your company. As of yesterday for the new [place] motor vehicle. I need to have that paperwork filled out and fax to them as soon as possible in regards to keeping my 's license active. Thank you.     It looks like Chase Alex already spoke to this pt this morning at [phone]

## 2023-09-05 NOTE — TELEPHONE ENCOUNTER
Patient left a voicemail to follow up on forms being faxed to office. Per chart review, no [place] forms received. Call returned to patient, [phone]. No answer. Message left advising no forms received. Requested call back with name of form needing completion.

## 2023-09-06 ENCOUNTER — TELEPHONE (OUTPATIENT)
Dept: NEUROLOGY | Facility: CLINIC | Age: 68
End: 2023-09-06

## 2023-09-06 NOTE — TELEPHONE ENCOUNTER
Recd 9/5/23 2:02pm Taken 9/6/23 10:33am  Vicky Orellana calling in regards to [facility] motor vehicles paperwork for Dr. Yoshi Pearl when it was faxed out from the [address] [place] [address] in [place] on 8/31/23 at 3 34 PM. The serial number is [id number]. It was faxed to [employer]. The [employer] store is [address] [place] [address], site [address] [place] [address]. Phone number. There is [phone]. Fax number [phone]. This is in regards to having the doctor fill out the paperwork for [place] motor vehicle and so I can keep my license. We faxed it to [phone] for [employer]. I really appreciate you. People being able to get back to me today and letting me know where I stand. Thank you. Fransisca. ________________________________    Second vm left. Pt already recd call from nursing (see below).

## 2023-09-06 NOTE — TELEPHONE ENCOUNTER
received vm from 9/5 at 3:29pm-Rachid moses calling again. This could go to [place] or wherever can answer a question for me. My YOB: 1955 in regards to my drivers license for [place] motor [place] paperwork that you guys didn't received that we did send and have confirmation to. Is it possible that I could take that paperwork to either the [place] office or [facility] office and have them fax it to you's tomorrow? I would appreciate a call back and let me know where I stand or what I can do. I need to get my license paperwork done from you guys to keep my license. Thank you.  Paulette.  -----------------------------------  I already spoke to pt earlier this am and per other encounter from today, pt cortney form to office and it is scanned into the chart,.    Jacquelyn-can you assist

## 2023-09-06 NOTE — TELEPHONE ENCOUNTER
received vm from 9/5 at 1:02pm-A yes, this is Rachid moses returning your call. Birth date, april 16th, 1965 in regard to the [place] motor vehicle paperwork that you guys need to fill out back to them. katerine a call back please. I really, really appreciate that. Thank you. Bye.  [phone]  ------------------------------------  Called pt. He will bring from to [facility] office.    He would like a call once form is completed and faxed  [phone]-qd to leave detailed message

## 2023-09-14 ENCOUNTER — TELEPHONE (OUTPATIENT)
Dept: NEUROLOGY | Facility: CLINIC | Age: 68
End: 2023-09-14

## 2023-09-14 NOTE — TELEPHONE ENCOUNTER
I completed almost all of the Attending report on head injury. However, the last question mentions if I reviewed the patient's case history. This was not completed by the patient. Please have him complete the Case History Statement-Head Injury for me to review then I can sign off.

## 2023-09-14 NOTE — TELEPHONE ENCOUNTER
Spoke with the patient stating the 3rd page needs to be completed by him. He said he will stop by the [facility] office to drop off the completed page. Valentín Morrissey - can you forward me once the page has been scanned into the media?  Thank you

## 2023-09-29 NOTE — TELEPHONE ENCOUNTER
Received VM transcription from 9/28/23, 2:10 PM:    Yolanda Martinez calling in prescription for Levetiracetam 1000 mg. I have just calling to [facility] and they're saying that you need to call them and re-authorize my prescriptions monica they won't send them to me. Their number is [phone]. If you could give me a call back and let me know what's going on, I'd appreciate that. Thank you. Paulette.  ------------------------------------------    Called [facility] and spoke with Sheree Covarrubias. She says medication just filled today. Receiving it 7- 10 days. Spoke with pt and informed him of same. He says that he also called them today and they are now saying they have script on file. Pt apologizes for misunderstanding. Nothing further at this time.

## 2023-10-02 NOTE — TELEPHONE ENCOUNTER
received  from 9/29 at 10:36am-this is Rachid moses calling in regards to a new prescription for [facility]. Born april 16th 1955. Phone number is [phone]. I cannot get my prescriptions renewed for the 90 days unless somebody there sends a new script to [facility]. I would appreciate a call back to let me know where I stand.  Thank you.  -----------------------------  Already addressed

## 2023-10-18 ENCOUNTER — TELEPHONE (OUTPATIENT)
Dept: NEUROLOGY | Facility: CLINIC | Age: 68
End: 2023-10-18

## 2023-10-18 NOTE — TELEPHONE ENCOUNTER
VM received 10/17/23 at 11:12 am, taken off 10/18/23: This is [facility], calling. YOB: 1955. In regards to [employer] motor [employer] paperwork. I have new paperwork that needs to be filled out by myself and you. The form is an [id number]. It needs to be completely filled out and sent back to them. If you could give me a call back so we can set this all up. I appreciate that. Thank you  ___________________________________  Prashanth Cohen with pt and made him aware the message was received. Pt stated that he will drop the forms off at the [facility] office, which is what he did last time.

## 2023-10-19 ENCOUNTER — TELEPHONE (OUTPATIENT)
Dept: NEUROLOGY | Facility: CLINIC | Age: 68
End: 2023-10-19

## 2023-10-25 NOTE — TELEPHONE ENCOUNTER
Called the patient he is going to [facility] office sign the forms and pay form fee. Scaning the [facility] forms in the media.

## 2023-11-27 ENCOUNTER — APPOINTMENT (OUTPATIENT)
Dept: LAB | Facility: HOSPITAL | Age: 68
End: 2023-11-27
Payer: COMMERCIAL

## 2023-11-27 DIAGNOSIS — G40.209 LOCALIZATION-RELATED FOCAL EPILEPSY WITH COMPLEX PARTIAL SEIZURES (HCC): ICD-10-CM

## 2023-11-27 LAB
ALBUMIN SERPL BCP-MCNC: 4.4 G/DL (ref 3.5–5)
ALP SERPL-CCNC: 68 U/L (ref 34–104)
ALT SERPL W P-5'-P-CCNC: 23 U/L (ref 7–52)
ANION GAP SERPL CALCULATED.3IONS-SCNC: 6 MMOL/L
AST SERPL W P-5'-P-CCNC: 21 U/L (ref 13–39)
BASOPHILS # BLD AUTO: 0.05 THOUSANDS/ÂΜL (ref 0–0.1)
BASOPHILS NFR BLD AUTO: 1 % (ref 0–1)
BILIRUB SERPL-MCNC: 0.86 MG/DL (ref 0.2–1)
BUN SERPL-MCNC: 13 MG/DL (ref 5–25)
CALCIUM SERPL-MCNC: 10.1 MG/DL (ref 8.4–10.2)
CHLORIDE SERPL-SCNC: 103 MMOL/L (ref 96–108)
CO2 SERPL-SCNC: 29 MMOL/L (ref 21–32)
CREAT SERPL-MCNC: 1.05 MG/DL (ref 0.6–1.3)
EOSINOPHIL # BLD AUTO: 0.11 THOUSAND/ÂΜL (ref 0–0.61)
EOSINOPHIL NFR BLD AUTO: 2 % (ref 0–6)
ERYTHROCYTE [DISTWIDTH] IN BLOOD BY AUTOMATED COUNT: 11.9 % (ref 11.6–15.1)
GFR SERPL CREATININE-BSD FRML MDRD: 73 ML/MIN/1.73SQ M
GLUCOSE P FAST SERPL-MCNC: 97 MG/DL (ref 65–99)
HCT VFR BLD AUTO: 47.4 % (ref 36.5–49.3)
HGB BLD-MCNC: 15.4 G/DL (ref 12–17)
IMM GRANULOCYTES # BLD AUTO: 0.05 THOUSAND/UL (ref 0–0.2)
IMM GRANULOCYTES NFR BLD AUTO: 1 % (ref 0–2)
LYMPHOCYTES # BLD AUTO: 1.61 THOUSANDS/ÂΜL (ref 0.6–4.47)
LYMPHOCYTES NFR BLD AUTO: 23 % (ref 14–44)
MCH RBC QN AUTO: 31.1 PG (ref 26.8–34.3)
MCHC RBC AUTO-ENTMCNC: 32.5 G/DL (ref 31.4–37.4)
MCV RBC AUTO: 96 FL (ref 82–98)
MONOCYTES # BLD AUTO: 0.8 THOUSAND/ÂΜL (ref 0.17–1.22)
MONOCYTES NFR BLD AUTO: 11 % (ref 4–12)
NEUTROPHILS # BLD AUTO: 4.4 THOUSANDS/ÂΜL (ref 1.85–7.62)
NEUTS SEG NFR BLD AUTO: 62 % (ref 43–75)
NRBC BLD AUTO-RTO: 0 /100 WBCS
PLATELET # BLD AUTO: 318 THOUSANDS/UL (ref 149–390)
PMV BLD AUTO: 10.2 FL (ref 8.9–12.7)
POTASSIUM SERPL-SCNC: 4.4 MMOL/L (ref 3.5–5.3)
PROT SERPL-MCNC: 7.5 G/DL (ref 6.4–8.4)
RBC # BLD AUTO: 4.95 MILLION/UL (ref 3.88–5.62)
SODIUM SERPL-SCNC: 138 MMOL/L (ref 135–147)
WBC # BLD AUTO: 7.02 THOUSAND/UL (ref 4.31–10.16)

## 2023-11-27 PROCEDURE — 80177 DRUG SCRN QUAN LEVETIRACETAM: CPT

## 2023-11-27 PROCEDURE — 80053 COMPREHEN METABOLIC PANEL: CPT

## 2023-11-27 PROCEDURE — 85025 COMPLETE CBC W/AUTO DIFF WBC: CPT

## 2023-11-27 PROCEDURE — 36415 COLL VENOUS BLD VENIPUNCTURE: CPT

## 2023-11-29 LAB — LEVETIRACETAM SERPL-MCNC: 20 UG/ML (ref 10–40)

## 2023-12-12 ENCOUNTER — APPOINTMENT (OUTPATIENT)
Dept: LAB | Facility: HOSPITAL | Age: 68
End: 2023-12-12
Payer: COMMERCIAL

## 2023-12-12 DIAGNOSIS — I51.9 MYXEDEMA HEART DISEASE: ICD-10-CM

## 2023-12-12 DIAGNOSIS — E87.1 HYPOSMOLALITY SYNDROME: ICD-10-CM

## 2023-12-12 DIAGNOSIS — G40.909 NONINTRACTABLE EPILEPSY WITHOUT STATUS EPILEPTICUS, UNSPECIFIED EPILEPSY TYPE (HCC): ICD-10-CM

## 2023-12-12 DIAGNOSIS — E03.9 MYXEDEMA HEART DISEASE: ICD-10-CM

## 2023-12-12 DIAGNOSIS — F52.21 ERECTILE DYSFUNCTION OF NONORGANIC ORIGIN: ICD-10-CM

## 2023-12-12 DIAGNOSIS — I10 ESSENTIAL HYPERTENSION, MALIGNANT: ICD-10-CM

## 2023-12-12 DIAGNOSIS — Z12.5 SPECIAL SCREENING FOR MALIGNANT NEOPLASM OF PROSTATE: ICD-10-CM

## 2023-12-12 DIAGNOSIS — E78.2 MIXED HYPERLIPIDEMIA: ICD-10-CM

## 2023-12-12 LAB
ALBUMIN SERPL BCP-MCNC: 4.3 G/DL (ref 3.5–5)
ALP SERPL-CCNC: 61 U/L (ref 34–104)
ALT SERPL W P-5'-P-CCNC: 33 U/L (ref 7–52)
ANION GAP SERPL CALCULATED.3IONS-SCNC: 8 MMOL/L
AST SERPL W P-5'-P-CCNC: 28 U/L (ref 13–39)
BASOPHILS # BLD AUTO: 0.05 THOUSANDS/ÂΜL (ref 0–0.1)
BASOPHILS NFR BLD AUTO: 1 % (ref 0–1)
BILIRUB SERPL-MCNC: 0.81 MG/DL (ref 0.2–1)
BUN SERPL-MCNC: 13 MG/DL (ref 5–25)
CALCIUM SERPL-MCNC: 10 MG/DL (ref 8.4–10.2)
CHLORIDE SERPL-SCNC: 105 MMOL/L (ref 96–108)
CHOLEST SERPL-MCNC: 131 MG/DL
CO2 SERPL-SCNC: 26 MMOL/L (ref 21–32)
CREAT SERPL-MCNC: 0.9 MG/DL (ref 0.6–1.3)
EOSINOPHIL # BLD AUTO: 0.15 THOUSAND/ÂΜL (ref 0–0.61)
EOSINOPHIL NFR BLD AUTO: 2 % (ref 0–6)
ERYTHROCYTE [DISTWIDTH] IN BLOOD BY AUTOMATED COUNT: 11.8 % (ref 11.6–15.1)
GFR SERPL CREATININE-BSD FRML MDRD: 88 ML/MIN/1.73SQ M
GLUCOSE P FAST SERPL-MCNC: 96 MG/DL (ref 65–99)
HCT VFR BLD AUTO: 45 % (ref 36.5–49.3)
HDLC SERPL-MCNC: 42 MG/DL
HGB BLD-MCNC: 14.8 G/DL (ref 12–17)
IMM GRANULOCYTES # BLD AUTO: 0.06 THOUSAND/UL (ref 0–0.2)
IMM GRANULOCYTES NFR BLD AUTO: 1 % (ref 0–2)
LDLC SERPL CALC-MCNC: 62 MG/DL (ref 0–100)
LYMPHOCYTES # BLD AUTO: 1.86 THOUSANDS/ÂΜL (ref 0.6–4.47)
LYMPHOCYTES NFR BLD AUTO: 26 % (ref 14–44)
MCH RBC QN AUTO: 31.4 PG (ref 26.8–34.3)
MCHC RBC AUTO-ENTMCNC: 32.9 G/DL (ref 31.4–37.4)
MCV RBC AUTO: 95 FL (ref 82–98)
MONOCYTES # BLD AUTO: 0.67 THOUSAND/ÂΜL (ref 0.17–1.22)
MONOCYTES NFR BLD AUTO: 9 % (ref 4–12)
NEUTROPHILS # BLD AUTO: 4.3 THOUSANDS/ÂΜL (ref 1.85–7.62)
NEUTS SEG NFR BLD AUTO: 61 % (ref 43–75)
NONHDLC SERPL-MCNC: 89 MG/DL
NRBC BLD AUTO-RTO: 0 /100 WBCS
PLATELET # BLD AUTO: 321 THOUSANDS/UL (ref 149–390)
PMV BLD AUTO: 10.6 FL (ref 8.9–12.7)
POTASSIUM SERPL-SCNC: 4.2 MMOL/L (ref 3.5–5.3)
PROT SERPL-MCNC: 7.6 G/DL (ref 6.4–8.4)
PSA SERPL-MCNC: 0.86 NG/ML (ref 0–4)
RBC # BLD AUTO: 4.72 MILLION/UL (ref 3.88–5.62)
SODIUM SERPL-SCNC: 139 MMOL/L (ref 135–147)
TRIGL SERPL-MCNC: 135 MG/DL
TSH SERPL DL<=0.05 MIU/L-ACNC: 0.63 UIU/ML (ref 0.45–4.5)
WBC # BLD AUTO: 7.09 THOUSAND/UL (ref 4.31–10.16)

## 2023-12-12 PROCEDURE — 85025 COMPLETE CBC W/AUTO DIFF WBC: CPT

## 2023-12-12 PROCEDURE — 84443 ASSAY THYROID STIM HORMONE: CPT

## 2023-12-12 PROCEDURE — 36415 COLL VENOUS BLD VENIPUNCTURE: CPT

## 2023-12-12 PROCEDURE — 80053 COMPREHEN METABOLIC PANEL: CPT

## 2023-12-12 PROCEDURE — 80061 LIPID PANEL: CPT

## 2023-12-12 PROCEDURE — G0103 PSA SCREENING: HCPCS

## 2023-12-15 ENCOUNTER — OFFICE VISIT (OUTPATIENT)
Dept: NEUROLOGY | Facility: CLINIC | Age: 68
End: 2023-12-15
Payer: COMMERCIAL

## 2023-12-15 VITALS
HEIGHT: 73 IN | WEIGHT: 217.8 LBS | HEART RATE: 102 BPM | BODY MASS INDEX: 28.86 KG/M2 | DIASTOLIC BLOOD PRESSURE: 69 MMHG | SYSTOLIC BLOOD PRESSURE: 111 MMHG

## 2023-12-15 DIAGNOSIS — G40.209 LOCALIZATION-RELATED FOCAL EPILEPSY WITH COMPLEX PARTIAL SEIZURES (HCC): ICD-10-CM

## 2023-12-15 PROCEDURE — 99213 OFFICE O/P EST LOW 20 MIN: CPT | Performed by: NURSE PRACTITIONER

## 2023-12-15 RX ORDER — LEVETIRACETAM 1000 MG/1
2000 TABLET ORAL 2 TIMES DAILY
Qty: 360 TABLET | Refills: 3 | Status: SHIPPED | OUTPATIENT
Start: 2023-12-15

## 2023-12-15 NOTE — PROGRESS NOTES
Patient ID: Rachid Linares is a 68 y.o. 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      Assessment/Plan:    Localization-related focal epilepsy with complex partial seizures (HCC)  Patient has been free of events concerning for seizures or episodes of collapse with loss of consciousness since levetiracetam was increased to 2000 mg BID. He denies any side effects or concerns associated with this medication.    His recent MRA of his head was normal. Recent MRI brain revealed  anterior frontal lobes and inferior frontal lobes bilaterally as well as the anterior temporal lobes bilaterally in right posterior inferior temporal lobe. Additional involvement of the subcortical right posterior frontal lobe and left frontoparietal junction are noted all of which are unchanged from the prior study. Recent EEG with R>L polymorphic delta activity and a few right sharp transients.     Patient will continue with current dose of levetiracetam 2000 mg BID. If there are seizures in the future, consider second AED such as lacosamide. He will call the office with seizures or concerns. Follow up as scheduled in May with Dr. Dye.  He will have his levetiracetam level checked about a week prior to that visit.      He will Return for Follow up as scheduled with Dr Dye.      Subjective:  Rachid Linares is a 68 y.o. 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 He was last seen in the office by Dr Dye on 5/10/2023. At that time, noted multiple areas of cortical encephalomalacia over the bilateral frontal and anterior temporal regions along with posterior frontal or anterior parietal regions. He was recently transitioned off of carbamazepine due to hyponatremia and onto levetiracetam. Prior to that visit, there were several focal impaired aware seizures on levetiracetam so recommended increasing to 2000  mg BID instead of going back to oxcarbazepine. If he continued to have seizures on maximal dose of levetiracetam, could consider adding lacosamide. Due to seizures recommended updated MRI brain to rule out structural lesions as well as an MRA due to episodes of him collapsing, loss of consciousness, without witnessed convulsions or altered awareness.  His were without acute findings, noted stable encephalomalacia.  He was last seen in the office by myself on 8/15/2023.  At that time there were no recurrent events on levetiracetam 2000 mg twice per day.  Overall he has been doing quite well since his last visit.  No changes were made to levetiracetam dosing at that time.  Recommended follow-up in 4 months.    The patient presents to the office today with his wife.  Since his last visit he reports that he has been doing well on levetiracetam 2000 mg twice per day.  There have been no recurrent seizures since his last visit.  He has been feeling well on current medication dosing.  No side effects or concerns.    No staring spells. No evidence of nocturnal seizure. No concerning myoclonus.     Current seizure medications:  - levetiracetam 2000 mg BID  Other medications as per Epic.     Latest Reference Range & Units 23 09:03   LEVETIRACETA (KEPPRA) 10.0 - 40.0 ug/mL 20.0     Event/Seizure semiology:   - possible focal impaired aware seizure - reported a feeling warm, then blacked out, blank staring      Special Features  Status epilepticus: No  Self Injury Seizures: No  Precipitating Factors: None  Post-ictal state: None     Epilepsy Risk Factors:  Abnormal pregnancy: No  Abnormal birth/: No  Abnormal Development: No  Febrile seizures, simple: No  Febrile seizures, complex: No  CNS infection: No  Mental retardation: No  Cerebral palsy: No  Head injury (moderate/severe): Yes -  fell from a ladder with severe head injury, coma  CNS neoplasm: No  CNS malformation: No  Neurosurgical procedure:  No  Stroke: No  CNS autoimmune disorder: No  Alcohol abuse: No  Drug abuse: No  Family history Sz/epilepsy: No     Prior AEDs:  medication Time used Reason to stop   carbamazepine ?-now hyponatremia   levetiracetam -now                Prior workup:  x  Imagin2015 - MRI brain study  There are multifocal regions of encephalomalacia and gliosis.  The most significant ones are right more than left anterior frontal lobe involvement (quite extensive T2 hyperintensity on the right extending down the white matter to the frontal horn of the lateral ventricle), right more than left anterior temporal pole, and specific gyri in the bilateral posterior lateral frontal lobes (anterior to the precentral gyrus), and one gyrus on the left parietal region.     2023 - MRA head  Normal MRA Brain.     23- MRI brain seizure w wo contrast  Encephalomalacia identified anterior frontal lobes and inferior frontal lobes bilaterally as well as the anterior temporal lobes bilaterally in right posterior inferior temporal lobe. Additional involvement of the subcortical right posterior frontal lobe and left frontoparietal junction are noted all of which are unchanged from the prior study. This pattern is typically seen in the setting of remote prior intracranial trauma with hemosiderin staining noted along the sulci of of the right frontal cortex. There is no mass, mass effect, or midline shift. No evidence of recent hemorrhage. Small scattered hyperintensities on T2/FLAIR imaging are noted in the periventricular and subcortical white matter demonstrating an appearance that   is statistically most likely to represent mild microangiopathic change.     2021 - DEXA  Consistent with osteoporosis  T score -2.2 (right and left femoral neck)  T score -2.7 (lumbar spine)     EEGs:  2021 - Normal awake study  2023 - right more than left polymorphic delta activity; few right sharp transients     Past Psychiatric  "History:  Depression: No  Anxiety: No  Psychosis: No    His history was also obtained from his wife, who was present at today's visit.        I reviewed prior neurology notes, most recent labs, as documented in [employer], and summarized above.        Objective:    Blood pressure 111/69, pulse 102, height 6' 1\" (1.854 m), weight 98.8 kg (217 lb 12.8 oz).    Physical Exam  No apparent distress.   Appears well nourished.   Mood appropriate for situation     Neurologic Exam  Mental status- alert and oriented to person, place, and time. Speech appropriate for conversation. Good attention and knowledge.     Cranial Nerves- PERRL, EOMS normal, facial muscles symmetric, hearing intact bilaterally to finger rubs, tongue midline, palate rise symmetrical, shoulder shrug symmetrical.    Motor- No pronator drift. Appropriate strength. Moves all extremities freely. No tremor.    Sensory-  Intact distally in all extremities to light touch.     DTRs-2+ and symmetric in all extremities    Gait- normal casual gait.     Coordination- FNF intact.     ROS:  Review of Systems   Constitutional:  Negative for appetite change, fatigue and fever.   HENT: Negative.  Negative for hearing loss, tinnitus, trouble swallowing and voice change.    Eyes: Negative.  Negative for photophobia, pain and visual disturbance.   Respiratory: Negative.  Negative for shortness of breath.    Cardiovascular: Negative.  Negative for palpitations.   Gastrointestinal: Negative.  Negative for nausea and vomiting.   Endocrine: Negative.  Negative for cold intolerance.   Genitourinary: Negative.  Negative for dysuria, frequency and urgency.   Musculoskeletal:  Negative for back pain, gait problem, myalgias and neck pain.   Skin: Negative.  Negative for rash.   Allergic/Immunologic: Negative.    Neurological: Negative.  Negative for dizziness, tremors, seizures, syncope, facial asymmetry, speech difficulty, weakness, light-headedness, numbness and headaches. "   Hematological: Negative.  Does not bruise/bleed easily.   Psychiatric/Behavioral: Negative.  Negative for confusion, hallucinations and sleep disturbance.    All other systems reviewed and are negative.        ROS obtained by MA and reviewed by myself.     This note may have been created using voice recognition software. There may be unintentional errors such as grammatical errors, spelling errors, or pronoun errors.

## 2023-12-15 NOTE — PATIENT INSTRUCTIONS
- Continue levetiracetam 2000 mg twice per day  - Call the office with seizures, issues or concerns  - Follow up in May with Dr Dye   - Have blood work about one week prior to that visit to check levetiracetam level

## 2023-12-15 NOTE — PROGRESS NOTES
Review of Systems   Constitutional:  Negative for appetite change, fatigue and fever.   HENT: Negative.  Negative for hearing loss, tinnitus, trouble swallowing and voice change.    Eyes: Negative.  Negative for photophobia, pain and visual disturbance.   Respiratory: Negative.  Negative for shortness of breath.    Cardiovascular: Negative.  Negative for palpitations.   Gastrointestinal: Negative.  Negative for nausea and vomiting.   Endocrine: Negative.  Negative for cold intolerance.   Genitourinary: Negative.  Negative for dysuria, frequency and urgency.   Musculoskeletal:  Negative for back pain, gait problem, myalgias and neck pain.   Skin: Negative.  Negative for rash.   Allergic/Immunologic: Negative.    Neurological: Negative.  Negative for dizziness, tremors, seizures, syncope, facial asymmetry, speech difficulty, weakness, light-headedness, numbness and headaches.   Hematological: Negative.  Does not bruise/bleed easily.   Psychiatric/Behavioral: Negative.  Negative for confusion, hallucinations and sleep disturbance.    All other systems reviewed and are negative.

## 2023-12-26 NOTE — ASSESSMENT & PLAN NOTE
Patient has been free of events concerning for seizures or episodes of collapse with loss of consciousness since levetiracetam was increased to 2000 mg BID. He denies any side effects or concerns associated with this medication.    His recent MRA of his head was normal. Recent MRI brain revealed  anterior frontal lobes and inferior frontal lobes bilaterally as well as the anterior temporal lobes bilaterally in right posterior inferior temporal lobe. Additional involvement of the subcortical right posterior frontal lobe and left frontoparietal junction are noted all of which are unchanged from the prior study. Recent EEG with R>L polymorphic delta activity and a few right sharp transients.     Patient will continue with current dose of levetiracetam 2000 mg BID. If there are seizures in the future, consider second AED such as lacosamide. He will call the office with seizures or concerns. Follow up as scheduled in May with Dr. Dye.  He will have his levetiracetam level checked about a week prior to that visit.

## 2024-01-01 ENCOUNTER — HOSPITAL ENCOUNTER (EMERGENCY)
Facility: HOSPITAL | Age: 69
End: 2024-07-19
Attending: EMERGENCY MEDICINE
Payer: COMMERCIAL

## 2024-01-01 VITALS
RESPIRATION RATE: 102 BRPM | SYSTOLIC BLOOD PRESSURE: 154 MMHG | DIASTOLIC BLOOD PRESSURE: 82 MMHG | OXYGEN SATURATION: 76 %

## 2024-01-01 DIAGNOSIS — I46.9 CARDIAC ARREST (HCC): Primary | ICD-10-CM

## 2024-01-01 LAB — GLUCOSE SERPL-MCNC: 154 MG/DL (ref 65–140)

## 2024-01-01 PROCEDURE — 82948 REAGENT STRIP/BLOOD GLUCOSE: CPT

## 2024-01-01 PROCEDURE — 96374 THER/PROPH/DIAG INJ IV PUSH: CPT

## 2024-01-01 PROCEDURE — 92950 HEART/LUNG RESUSCITATION CPR: CPT

## 2024-01-01 PROCEDURE — 99285 EMERGENCY DEPT VISIT HI MDM: CPT

## 2024-01-01 PROCEDURE — 96375 TX/PRO/DX INJ NEW DRUG ADDON: CPT

## 2024-01-01 RX ORDER — NALOXONE HYDROCHLORIDE 1 MG/ML
INJECTION PARENTERAL CODE/TRAUMA/SEDATION MEDICATION
Status: COMPLETED | OUTPATIENT
Start: 2024-01-01 | End: 2024-01-01

## 2024-01-01 RX ORDER — EPINEPHRINE 0.1 MG/ML
INJECTION INTRAVENOUS CODE/TRAUMA/SEDATION MEDICATION
Status: COMPLETED | OUTPATIENT
Start: 2024-01-01 | End: 2024-01-01

## 2024-01-01 RX ORDER — CALCIUM CHLORIDE 100 MG/ML
SYRINGE (ML) INTRAVENOUS CODE/TRAUMA/SEDATION MEDICATION
Status: COMPLETED | OUTPATIENT
Start: 2024-01-01 | End: 2024-01-01

## 2024-01-01 RX ORDER — SODIUM BICARBONATE 84 MG/ML
INJECTION, SOLUTION INTRAVENOUS CODE/TRAUMA/SEDATION MEDICATION
Status: COMPLETED | OUTPATIENT
Start: 2024-01-01 | End: 2024-01-01

## 2024-01-01 RX ADMIN — EPINEPHRINE 1 MG: 0.1 INJECTION INTRAVENOUS at 10:00

## 2024-01-01 RX ADMIN — CALCIUM CHLORIDE 1 G: 100 INJECTION PARENTERAL at 10:02

## 2024-01-01 RX ADMIN — NALOXONE HYDROCHLORIDE 2 MG: 1 INJECTION PARENTERAL at 10:04

## 2024-01-01 RX ADMIN — EPINEPHRINE 1 MG: 0.1 INJECTION INTRAVENOUS at 10:03

## 2024-01-01 RX ADMIN — EPINEPHRINE 1 MG: 0.1 INJECTION INTRAVENOUS at 09:56

## 2024-01-01 RX ADMIN — SODIUM BICARBONATE 100 MEQ: 84 INJECTION, SOLUTION INTRAVENOUS at 09:58

## 2024-01-01 RX ADMIN — EPINEPHRINE 1 MG: 0.1 INJECTION INTRAVENOUS at 10:06

## 2024-04-16 ENCOUNTER — APPOINTMENT (OUTPATIENT)
Dept: LAB | Facility: HOSPITAL | Age: 69
End: 2024-04-16
Payer: COMMERCIAL

## 2024-04-16 DIAGNOSIS — G40.909 NONINTRACTABLE EPILEPSY WITHOUT STATUS EPILEPTICUS, UNSPECIFIED EPILEPSY TYPE (HCC): ICD-10-CM

## 2024-04-16 DIAGNOSIS — E87.1 HYPOSMOLALITY SYNDROME: ICD-10-CM

## 2024-04-16 DIAGNOSIS — F52.21 ERECTILE DYSFUNCTION OF NONORGANIC ORIGIN: ICD-10-CM

## 2024-04-16 DIAGNOSIS — I10 HYPERTENSION, ESSENTIAL: ICD-10-CM

## 2024-04-16 DIAGNOSIS — G40.209 LOCALIZATION-RELATED FOCAL EPILEPSY WITH COMPLEX PARTIAL SEIZURES (HCC): ICD-10-CM

## 2024-04-16 DIAGNOSIS — E03.9 ACQUIRED HYPOTHYROIDISM: ICD-10-CM

## 2024-04-16 DIAGNOSIS — E78.5 HYPERLIPIDEMIA, UNSPECIFIED HYPERLIPIDEMIA TYPE: ICD-10-CM

## 2024-04-16 LAB
ALBUMIN SERPL BCP-MCNC: 4.2 G/DL (ref 3.5–5)
ALP SERPL-CCNC: 54 U/L (ref 34–104)
ALT SERPL W P-5'-P-CCNC: 23 U/L (ref 7–52)
ANION GAP SERPL CALCULATED.3IONS-SCNC: 8 MMOL/L (ref 4–13)
AST SERPL W P-5'-P-CCNC: 21 U/L (ref 13–39)
BASOPHILS # BLD AUTO: 0.07 THOUSANDS/ÂΜL (ref 0–0.1)
BASOPHILS NFR BLD AUTO: 1 % (ref 0–1)
BILIRUB SERPL-MCNC: 0.84 MG/DL (ref 0.2–1)
BUN SERPL-MCNC: 14 MG/DL (ref 5–25)
CALCIUM SERPL-MCNC: 9.6 MG/DL (ref 8.4–10.2)
CHLORIDE SERPL-SCNC: 106 MMOL/L (ref 96–108)
CO2 SERPL-SCNC: 28 MMOL/L (ref 21–32)
CREAT SERPL-MCNC: 0.96 MG/DL (ref 0.6–1.3)
EOSINOPHIL # BLD AUTO: 0.13 THOUSAND/ÂΜL (ref 0–0.61)
EOSINOPHIL NFR BLD AUTO: 2 % (ref 0–6)
ERYTHROCYTE [DISTWIDTH] IN BLOOD BY AUTOMATED COUNT: 12.2 % (ref 11.6–15.1)
GFR SERPL CREATININE-BSD FRML MDRD: 80 ML/MIN/1.73SQ M
GLUCOSE P FAST SERPL-MCNC: 90 MG/DL (ref 65–99)
HCT VFR BLD AUTO: 44.1 % (ref 36.5–49.3)
HGB BLD-MCNC: 14.6 G/DL (ref 12–17)
IMM GRANULOCYTES # BLD AUTO: 0.06 THOUSAND/UL (ref 0–0.2)
IMM GRANULOCYTES NFR BLD AUTO: 1 % (ref 0–2)
LEVETIRACETAM SERPL-MCNC: 29.9 UG/ML (ref 12–46)
LYMPHOCYTES # BLD AUTO: 1.89 THOUSANDS/ÂΜL (ref 0.6–4.47)
LYMPHOCYTES NFR BLD AUTO: 25 % (ref 14–44)
MCH RBC QN AUTO: 31.7 PG (ref 26.8–34.3)
MCHC RBC AUTO-ENTMCNC: 33.1 G/DL (ref 31.4–37.4)
MCV RBC AUTO: 96 FL (ref 82–98)
MONOCYTES # BLD AUTO: 0.86 THOUSAND/ÂΜL (ref 0.17–1.22)
MONOCYTES NFR BLD AUTO: 12 % (ref 4–12)
NEUTROPHILS # BLD AUTO: 4.48 THOUSANDS/ÂΜL (ref 1.85–7.62)
NEUTS SEG NFR BLD AUTO: 59 % (ref 43–75)
NRBC BLD AUTO-RTO: 0 /100 WBCS
PLATELET # BLD AUTO: 275 THOUSANDS/UL (ref 149–390)
PMV BLD AUTO: 10.9 FL (ref 8.9–12.7)
POTASSIUM SERPL-SCNC: 4.1 MMOL/L (ref 3.5–5.3)
PROT SERPL-MCNC: 7.2 G/DL (ref 6.4–8.4)
RBC # BLD AUTO: 4.6 MILLION/UL (ref 3.88–5.62)
SODIUM SERPL-SCNC: 142 MMOL/L (ref 135–147)
TSH SERPL DL<=0.05 MIU/L-ACNC: 1.06 UIU/ML (ref 0.45–4.5)
WBC # BLD AUTO: 7.49 THOUSAND/UL (ref 4.31–10.16)

## 2024-04-16 PROCEDURE — 84443 ASSAY THYROID STIM HORMONE: CPT

## 2024-04-16 PROCEDURE — 36415 COLL VENOUS BLD VENIPUNCTURE: CPT

## 2024-04-16 PROCEDURE — 85025 COMPLETE CBC W/AUTO DIFF WBC: CPT

## 2024-04-16 PROCEDURE — 83520 IMMUNOASSAY QUANT NOS NONAB: CPT

## 2024-04-16 PROCEDURE — 80053 COMPREHEN METABOLIC PANEL: CPT

## 2024-04-17 ENCOUNTER — TELEPHONE (OUTPATIENT)
Dept: NEUROLOGY | Facility: CLINIC | Age: 69
End: 2024-04-17

## 2024-04-17 NOTE — TELEPHONE ENCOUNTER
----- Message from ZULMA Canales sent at 4/17/2024 12:28 PM EDT -----  Continue current medications  ----- Message -----  From: Lab, Background User  Sent: 4/16/2024   9:51 PM EDT  To: ZULMA Canales

## 2024-04-23 ENCOUNTER — TELEPHONE (OUTPATIENT)
Age: 69
End: 2024-04-23

## 2024-04-23 NOTE — TELEPHONE ENCOUNTER
Patients GI provider:       Number to return call: [phone]    Reason for call: Pt calling back appt appt made for 4/30 in [place]     Scheduled procedure/appointment date if applicable: Apt/procedure

## 2024-04-23 NOTE — TELEPHONE ENCOUNTER
Called patient, no answer, left message requesting a call back regarding scheduling consult appointment. Per ANJELICA Moore patient can be scheduled on urgent visit.

## 2024-04-23 NOTE — TELEPHONE ENCOUNTER
Patients GI provider:  Dr. Jensen    Number to return call: [phone]    Reason for call:  Carmela from Dr Reis office was calling to set a pt up for an EGD consult that Dr. Reis would like the EGD done in the next 2-3 weeks. The pt would be new to us. Please call the pt with a sooner OV then what is available. Carmela said if Dr. Reis needs to call in they can do that. EGD is wanted for gastric bloating , burping and weight loss

## 2024-04-30 ENCOUNTER — OFFICE VISIT (OUTPATIENT)
Dept: GASTROENTEROLOGY | Facility: CLINIC | Age: 69
End: 2024-04-30
Payer: COMMERCIAL

## 2024-04-30 VITALS
WEIGHT: 216 LBS | DIASTOLIC BLOOD PRESSURE: 73 MMHG | BODY MASS INDEX: 28.63 KG/M2 | HEART RATE: 77 BPM | HEIGHT: 73 IN | SYSTOLIC BLOOD PRESSURE: 113 MMHG | OXYGEN SATURATION: 93 %

## 2024-04-30 DIAGNOSIS — R10.13 DYSPEPSIA: Primary | ICD-10-CM

## 2024-04-30 DIAGNOSIS — K21.9 GASTROESOPHAGEAL REFLUX DISEASE, UNSPECIFIED WHETHER ESOPHAGITIS PRESENT: ICD-10-CM

## 2024-04-30 DIAGNOSIS — R14.0 BLOATING: ICD-10-CM

## 2024-04-30 PROCEDURE — 99204 OFFICE O/P NEW MOD 45 MIN: CPT | Performed by: INTERNAL MEDICINE

## 2024-04-30 RX ORDER — PANTOPRAZOLE SODIUM 40 MG/1
40 TABLET, DELAYED RELEASE ORAL DAILY
COMMUNITY
Start: 2024-04-23

## 2024-04-30 NOTE — PATIENT INSTRUCTIONS
Scheduled date of EGD(as of today): 6/5/24  Physician performing EGD: Geoffrey  Location of EGD: [facility]  Instructions reviewed with patient by: Barbara AVERY  Clearances: n/a

## 2024-04-30 NOTE — PROGRESS NOTES
Consultation -  [facility]  Rachid Linares 69 y.o. male MRN: [id number]  Unit/Bed#:  Encounter: [id number]        Consults    ASSESSMENT/PLAN:       1.  Belching  2.  GERD  3.  Abdominal bloating  -Symptoms improved since starting on pantoprazole 40 mg few weeks ago but not completely resolved.  -Patient does report dietary indiscretions therefore would recommend to follow non ulcerogenic diet closely.  -Avoid NSAIDs, use acetaminophen when possible.    -Patient was explained about the lifestyle and dietary modifications.  Advised to avoid fatty foods, chocolates, caffeine, alcohol, and any other triggering foods.  Avoid eating for at least 3 hours before going to bed.  -Recommend EGD for further evaluation.    4.  Colon cancer screening: Patient reports having had colonoscopy 3-4 years ago at [facility].  I do not have these records.  As per wife patient was likely recommended a 5 to 10-year interval.  Will try to obtain these results.      ______________________________________________________________________    Reason for Consult / Principal Problem: [unfilled]    HPI: Rachid Linares is a 69 y.o. 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  Patient reports being on pantoprazole with improvement of heartburn however has persistent symptoms of bloating and belching.  He does report drinking caffeine regularly.  Denies any significant NSAID use.  No melena or hematochezia.  No dysphagia.  No hematemesis, unintentional weight loss or melena.  No prior EGD.  Patient reports that symptoms began approximately 3 to 4 months ago.  Denies any change in diet or medications, no significant NSAID use accounting for the change in symptoms.  Colonoscopy was performed 3-4 years ago as per patient's wife.    Labs are reviewed, thyroid function normal, CBC normal, CMP normal.      Review of  Systems:  The remainder of the review of systems was negative except for the pertinent positives noted in HPI.     Historical Information   Past Medical History:   Diagnosis Date    Head injury      Past Surgical History:   Procedure Laterality Date    ROTATOR CUFF REPAIR Bilateral     TONSILLECTOMY       Social History   Social History     Substance and Sexual Activity   Alcohol Use Not Currently     Social History     Substance and Sexual Activity   Drug Use Never     Social History     Tobacco Use   Smoking Status Former   Smokeless Tobacco Never   Tobacco Comments    quit around 2005     Family History   Problem Relation Age of Onset    Diabetes Father        Meds/Allergies     (Not in a hospital admission)    No current facility-administered medications for this visit.       No Known Allergies    Objective     There were no vitals taken for this visit.    [unfilled]    PHYSICAL EXAM     GEN: well nourished, well developed, no acute distress  HEENT: anicteric, MMM, no cervical or supraclavicular lymphadenopathy  CV: RRR, no m/r/g  CHEST: CTA b/l, no WRR  ABD: +BS, soft, NT/ND, no hepatosplenomegaly  EXT: no c/c/e  SKIN: no rashes,  NEURO: aaox3    Lab Results:   No visits with results within 1 Day(s) from this visit.   Latest known visit with results is:   Appointment on 04/16/2024   Component Date Value    Levetiracetam Lvl 04/16/2024 29.9     WBC 04/16/2024 7.49     RBC 04/16/2024 4.60     Hemoglobin 04/16/2024 14.6     Hematocrit 04/16/2024 44.1     MCV 04/16/2024 96     MCH 04/16/2024 31.7     MCHC 04/16/2024 33.1     RDW 04/16/2024 12.2     MPV 04/16/2024 10.9     Platelets 04/16/2024 275     nRBC 04/16/2024 0     Segmented % 04/16/2024 59     Immature Grans % 04/16/2024 1     Lymphocytes % 04/16/2024 25     Monocytes % 04/16/2024 12     Eosinophils Relative 04/16/2024 2     Basophils Relative 04/16/2024 1     Absolute Neutrophils 04/16/2024 4.48     Absolute Immature Grans 04/16/2024 0.06     Absolute  Lymphocytes 04/16/2024 1.89     Absolute Monocytes 04/16/2024 0.86     Eosinophils Absolute 04/16/2024 0.13     Basophils Absolute 04/16/2024 0.07     Sodium 04/16/2024 142     Potassium 04/16/2024 4.1     Chloride 04/16/2024 106     CO2 04/16/2024 28     ANION GAP 04/16/2024 8     BUN 04/16/2024 14     Creatinine 04/16/2024 0.96     Glucose, Fasting 04/16/2024 90     Calcium 04/16/2024 9.6     AST 04/16/2024 21     ALT 04/16/2024 23     Alkaline Phosphatase 04/16/2024 54     Total Protein 04/16/2024 7.2     Albumin 04/16/2024 4.2     Total Bilirubin 04/16/2024 0.84     eGFR 04/16/2024 80     TSH 3RD GENERATON 04/16/2024 1.063      Imaging Studies: I have personally reviewed pertinent films in PACS

## 2024-05-07 NOTE — PROGRESS NOTES
"[facility]  Patient's Name: Rachid Linares   Patient's : 1955   Visit Type: follow-up  Referring MD / PCP:  Edgar Reis MD    Assessment:  Mr. Rachid Linares is a 69 y.o. 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  He has multiple areas of cortical encephalomalacia over the bilateral frontal and anterior temporal regions along with posterior frontal or anterior parietal regions.  He is tolerating levetiracetam without significant cognitive or psychiatric side effects.  He reports no cognitive difficulties despite the bilateral brain injuries.  His MRI brain study did not show new lesions of injury and MRA brain did not show vertebrobasilar stenosis.    Plan:   1 - Continue Levetiracetam 1000mg tab take two tabs twice a day  2 - Call the office if you have episodes of confusion, staring, unresponsive behavior, or loss of consciousness.  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 right handed male here for follow-up evaluation of seizure disorder with recent black outs.    Interval History 2024  He reports that there have been no recurrence of passing out, convulsions, or blank staring episodes.  He does not remember why he said \"oh shit\" prior to his last set of seizures.  He was not able to recall the sensation.  There have been no focal auras to report.    AED/side effects/compliance:  Levetiracetam 9512-5121  No missed doses  No side effects to report    Event/Seizure semiology:   - possible focal impaired aware seizure - reported a feeling " "warm, then blacked out, blank staring      Prior Epilepsy History:  Intake History 11/17/2021  Records from Dr. Reis are limited with respect to epilepsy diagnosis.  Reference to a head injury in 1996 resulting in seizures.  Mr. 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  He has no recollection of what happened, no memory of the accident.  He believes that he was in [facility], \"not with it\".  He ended up in [facility].  He may have been in a coma.  He may have hit the back of his head on the concrete curve, there was a fracture and there was an injury (area of \"dead brain\").    It was about 6 months later he had an episode.  He recalled he was at work, then woke up in the hospital.  He had no recollection what happened.  His wife recalled that Rachid was being taken to the hospital, but no information was provided to him.  He does not what was the diagnosis.  He went to see a neurologist (but does not remember the name of the doctor).  He had a 24 hours ambulatory EEG study.  He was put on carbamazepine 200-200, to be on the safe side, so that he would never have another episode.  He was never given a formal diagnosis.    He has been seen by Dr. Reis ever since this episode happened.  He has not seen a neurologist for years.    The patient denies any history of myoclonus, staring spells, automatisms, unexplained hyperkinetic behaviors, olfactory / gustatory hallucinations, epigastric rising events, susan vu events, visual hallucinations, unexplained nocturnal enuresis, or nocturnal tongue biting.    Summary 2022  -750, -200.  He started levetiracetam titrated to 750-750. There has been no recurrent seizure since 1996.  We weaned him off of carbamazepine and increased his levetiracetam dose to 1000mg twice a day.    Summary 2023  LEV [id number].  On 3/21/2023 - ED visit for passing out.  These passing out events started about a " "month prior to the ED visit.  His wife described that he would suddenly stop in his tracks, say \"Oh shit\", then he goes to the ground with a blank stare.  He will say his name but look at her and say \"what?\".  He has no recollection of what happened.  He may appear to be unresponsive for 1-2 minutes but snaps out of it.    Rachid recalled going to the hospital, he recalled saying \"oh shit\" and feeling warm all over his face (flushed), then he blacked out.    His wife has seen about 3 of these events/seizures.  She recalled walking around the neighborhood, he stopped said \"oh shit\", he said he was not feeling right, he collapsed on her, eyes closed, then woke up within a few seconds.  He was not out for a long time, there was no convulsion.  There was an episode that happened in the shower, he just blacked out.  She heard a thump, found him sitting out of the tub, he was already conscious, no physical injury.    He followed up with Jennifer Keys in  - LEV increased to [id number].  No additional events or episodes of black outs were reported.    Special Features  Status epilepticus: No  Self Injury Seizures: No  Precipitating Factors: None  Post-ictal state: None    Epilepsy Risk Factors:  Abnormal pregnancy: No  Abnormal birth/: No  Abnormal Development: No  Febrile seizures, simple: No  Febrile seizures, complex: No  CNS infection: No  Mental retardation: No  Cerebral palsy: No  Head injury (moderate/severe): Yes -  fell from a ladder with severe head injury, coma  CNS neoplasm: No  CNS malformation: No  Neurosurgical procedure: No  Stroke: No  CNS autoimmune disorder: No  Alcohol abuse: No  Drug abuse: No  Family history Sz/epilepsy: No    Prior AEDs:  medication Time used Reason to stop   carbamazepine ?-now hyponatremia   levetiracetam -now           Prior workup:  x  Imagin2015 - MRI brain study  There are multifocal regions of encephalomalacia and gliosis.  The most significant " ones are right more than left anterior frontal lobe involvement (quite extensive T2 hyperintensity on the right extending down the white matter to the frontal horn of the lateral ventricle), right more than left anterior temporal pole, and specific gyri in the bilateral posterior lateral frontal lobes (anterior to the precentral gyrus), and one gyrus on the left parietal region.    5/31/2023  MRA of head - normal (no vertebrobasilar stenosis)  MRI brain - encephalomalacia - bilateral anterior frontal and inferior frontal lobes and anterior temporal lobes, and right posterior inferior temporal lobe.  There is involvement of the right posterior frontal lobe and left frontoparietal junction.  There is hemosiderin staining in the sulci of the right frontal cortex.    Symmetric hippocampal formations  Mild small scattered T2 hyperintensities in the periventricular and subcortical white matter.    12/27/2021 - DEXA  Consistent with osteoporosis  T score -2.2 (right and left femoral neck)  T score -2.7 (lumbar spine)    EEGs:  12/27/2021 - Normal awake study  4/26/2023 - right more than left polymorphic delta activity; few right sharp transients    Labs:  Component      Latest Ref Rng 4/16/2024   Sodium      135 - 147 mmol/L 142    Potassium      3.5 - 5.3 mmol/L 4.1    Chloride      96 - 108 mmol/L 106    Carbon Dioxide      21 - 32 mmol/L 28    ANION GAP      4 - 13 mmol/L 8    BUN      5 - 25 mg/dL 14    Creatinine      0.60 - 1.30 mg/dL 0.96    GLUCOSE, FASTING      65 - 99 mg/dL 90    Calcium      8.4 - 10.2 mg/dL 9.6    AST      13 - 39 U/L 21    ALT      7 - 52 U/L 23    ALK PHOS      34 - 104 U/L 54    Total Protein      6.4 - 8.4 g/dL 7.2    Albumin      3.5 - 5.0 g/dL 4.2    Total Bilirubin      0.20 - 1.00 mg/dL 0.84    GFR, Calculated      ml/min/1.73sq m 80    LEVETIRACETA (KEPPRA)      12.0 - 46.0 ug/mL 29.9        General exam   BP 98/62 (BP Location: Left arm, Patient Position: Sitting, Cuff Size: Large)    "Pulse 86   Ht 6' 1\" (1.854 m)   Wt 98 kg (216 lb)   BMI 28.50 kg/m²    Appearance: normally developed, appears well  Carotids: not assessed  Cardiovascular: regular rate and rhythm and normal heart sounds  Pulmonary: clear to auscultation   Extremities: no edema    HEENT: anicteric and moist mucus membranes / oral cavity   Fundoscopy: not assessed    Mental status  Orientation: alert and oriented to name, place, time  Fund of Knowledge: intact   Attention and Concentration: able to spell HOUSE forwards and backwards  Current and Remote Memory:recalled 2/3 words after five minutes  Language: spontaneous speech is normal and comprehension is intact    Cranial Nerves  CN 1: not tested  CN 2: pupils equal round reactive to direct and consenual light   CN 3, 4, 6: EOMI, no nystagmus  CN 5:sensation intact to all distribution V1, V2, V3  CN 7:muscles of facial expression are symmetric  CN 8:not assessed  CN 9, 10:no dysarthria present  CN 11:symmetric shoulder shrug  CN 12:tongue is midline    Motor:  Bulk, Tone: normal bulk, normal tone  Pronation: not assessed  Strength: Patient has full strength symmetrically of shoulder abduction, biceps, triceps, wrist flexion, wrist extension, finger flexion, finger abduction, hip flexion, knee flexion, knee extension, dorsiflexion, plantar flexion.   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)    Traumatic brain injury (HCC)    Gastroesophageal reflux disease without " esophagitis     Past Surgical History:   Procedure Laterality Date    ROTATOR CUFF REPAIR Bilateral     TONSILLECTOMY       Past Psychiatric History:  Depression: No  Anxiety: No  Psychosis: No    Medications:    Current Outpatient Medications:     amLODIPine (NORVASC) 5 mg tablet, Take 5 mg by mouth in the morning., Disp: , Rfl:     Calcium Carbonate-Vitamin D 600-400 MG-UNIT per tablet, Take 1 tablet by mouth 2 (two) times a day, Disp: , Rfl:     levETIRAcetam (Keppra) 1000 MG tablet, Take 2 tablets (2,000 mg total) by mouth 2 (two) times a day, Disp: 360 tablet, Rfl: 3    levothyroxine 175 mcg tablet, Take 175 mcg by mouth every morning, Disp: , Rfl:     lisinopril (ZESTRIL) 20 mg tablet, Take 20 mg by mouth daily, Disp: , Rfl:     Multiple Vitamin (multivitamin) tablet, Take 1 tablet by mouth daily, Disp: , Rfl:     pantoprazole (PROTONIX) 40 mg tablet, Take 40 mg by mouth daily, Disp: , Rfl:     rosuvastatin (CRESTOR) 20 MG tablet, Take 20 mg by mouth in the morning., Disp: , Rfl:     Allergies:  No Known Allergies    Family history:  Family History   Problem Relation Age of Onset    Diabetes Father      There is no family history of seizure, epilepsy or developmental delay.      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 He has never used smokeless tobacco. He reports that he does not currently use alcohol. He reports that he does not use drugs.    Review of Systems  A review of at least 12 organ/systems was obtained by the medical assistant and reviewed by me, including additional positives/negatives:  A review of at least 12 organ/systems was evaluated and there are no complaints.    The total amount of time spent with the patient along with pre-chart and post-chart preparation was 23 minutes on  the calendar day of the date of service.  This included history taking, physical exam, review of ancillary testing, counseling provided to the patient regarding diagnosis, medications, treatment, and risk management, and other communication to the patient's providers and/or family.  Start time: 10:31AM  End time: 10:54AM

## 2024-05-08 ENCOUNTER — OFFICE VISIT (OUTPATIENT)
Dept: NEUROLOGY | Facility: CLINIC | Age: 69
End: 2024-05-08
Payer: COMMERCIAL

## 2024-05-08 VITALS
WEIGHT: 216 LBS | HEART RATE: 86 BPM | BODY MASS INDEX: 28.63 KG/M2 | HEIGHT: 73 IN | DIASTOLIC BLOOD PRESSURE: 62 MMHG | SYSTOLIC BLOOD PRESSURE: 98 MMHG

## 2024-05-08 DIAGNOSIS — G40.209 LOCALIZATION-RELATED FOCAL EPILEPSY WITH COMPLEX PARTIAL SEIZURES (HCC): ICD-10-CM

## 2024-05-08 PROBLEM — K21.9 GASTROESOPHAGEAL REFLUX DISEASE WITHOUT ESOPHAGITIS: Status: ACTIVE | Noted: 2024-05-08

## 2024-05-08 PROBLEM — R55 BLACK-OUT (NOT AMNESIA): Status: RESOLVED | Noted: 2023-05-10 | Resolved: 2024-05-08

## 2024-05-08 PROCEDURE — 99213 OFFICE O/P EST LOW 20 MIN: CPT | Performed by: PSYCHIATRY & NEUROLOGY

## 2024-05-08 PROCEDURE — G2211 COMPLEX E/M VISIT ADD ON: HCPCS | Performed by: PSYCHIATRY & NEUROLOGY

## 2024-05-08 RX ORDER — LEVETIRACETAM 1000 MG/1
2000 TABLET ORAL 2 TIMES DAILY
Qty: 360 TABLET | Refills: 3 | Status: SHIPPED | OUTPATIENT
Start: 2024-05-08

## 2024-05-08 NOTE — PROGRESS NOTES
"Patient ID: Rachid Linares is a 69 y.o. male.    Assessment/Plan:    No problem-specific Assessment & Plan notes found for this encounter.       {Assess/PlanSmartLinks:50047}       Subjective:    HPI    {[facility] HPI texts:85150}    {Common ambulatory SmartLinks:28075}         Objective:    Blood pressure 98/62, pulse 86, height 6' 1\" (1.854 m), weight 98 kg (216 lb).    Physical Exam    Neurological Exam      ROS:    Review of Systems   Constitutional:  Negative for appetite change, fatigue and fever.   HENT: Negative.  Negative for hearing loss, tinnitus, trouble swallowing and voice change.    Eyes: Negative.  Negative for photophobia, pain and visual disturbance.   Respiratory: Negative.  Negative for shortness of breath.    Cardiovascular: Negative.  Negative for palpitations.   Gastrointestinal: Negative.  Negative for nausea and vomiting.   Endocrine: Negative.  Negative for cold intolerance.   Genitourinary: Negative.  Negative for dysuria, frequency and urgency.   Musculoskeletal:  Negative for back pain, gait problem, myalgias, neck pain and neck stiffness.   Skin: Negative.  Negative for rash.   Allergic/Immunologic: Negative.    Neurological: Negative.  Negative for dizziness, tremors, seizures, syncope, facial asymmetry, speech difficulty, weakness, light-headedness, numbness and headaches.   Hematological: Negative.  Does not bruise/bleed easily.   Psychiatric/Behavioral: Negative.  Negative for confusion, hallucinations and sleep disturbance.                    "

## 2024-05-08 NOTE — PATIENT INSTRUCTIONS
1 - Continue Levetiracetam 1000mg tab take two tabs twice a day  2 - Call the office if you have episodes of confusion, staring, unresponsive behavior, or loss of consciousness.  If you have recurrent spells of impaired awareness or seizures, then you cannot drive  3 - follow-up in 6 months    FIRST AID FOR SEIZURES    What to Do If You Witness a Seizure:     Generalized convulsive (called a generalized tonic-clonic or grand mal seizure). During this seizure, the person may cry out, suddenly stiffen up, make jerking movements, and fall.  The person may turn pale or blue from difficulty breathing.    Actions:  Stay calm. Talk in a soothing voice and if possible keep onlookers away.  Prevent injury. Move objects away that the person might hit while jerking uncontrollably.   Time when the seizure starts and ends. Most seizures stop after only a few minutes.  Turn him or her gently onto one side. This will help keep the airway clear.   Never place anything in his/her mouth or give him/her anything by mouth during a seizure.   -- Do not give the person water, pills, or food until fully alert.   Loosen tight clothing or jewelry around his/her neck.  Make the person as comfortable as possible.   Place something soft under their head.   Do not hold the person down. If the person having a seizure thrashes around there is no need for you to restrain them. They are more likely to be combative if restrained. Remember to consider your safety as well.   Keep onlookers away.   Be sensitive and supportive, and ask others to do the same.   Stay with the person until he/she is fully alert.    Complex partial seizure (confusional spells). During this kind of seizure, the person may have a glassy stare; give no response or inappropriate responses when questioned; sit, stand, or walk about aimlessly; make lip smacking or chewing motions; fidget with clothes; appear to be drunk, drugged, or confused.    Actions:  Make sure the person is  safe and won’t harm themselves.  Try to remove harmful objects from around the person (tools, utensils, glasses).  Do NOT be aggressive or attempt to restrain the person. They are more likely to be combative if restrained. Remember to consider your safety as well.  Help prevent the person from wandering, and direct the person to chair or safe position.  Never place anything in his/her mouth or give him/her anything by mouth during a seizure.   -- Do not give the person water, pills, or food until fully alert.   Keep onlookers away.   Be sensitive and supportive, and ask others to do the same.   Stay with the person until he/she is fully alert.    CALL 911 if:  A convulsive seizure lasts more than 5 minutes.  The person turns blue during the seizure.  The person does not start breathing after the seizure. Begin mouth to mouth resuscitation if this would occur.  The person has one seizure right after the other without coming back to normal consciousness between the seizures.  The person has not regained consciousness or is still confused after 30 minutes.  You know the person does not have epilepsy.  You know the person has diabetes or low blood sugar.  The person is pregnant, ill, or injured.   The seizure occurred in water, because the person may have inhaled water.  The person requests an ambulance or medical help.     Rescue medication  Your doctor may prescribe a rescue medication such as lorazepam (Ativan), diazepam (Valium / Diastat), or clonazepam (Klonopin) to terminate a seizure or if you have a history of cluster of seizures.  Follow the instructions given by your doctor for these medications    Recognizing Common Seizures (examples)   Simple partial seizures: Isolated twitching, numbness, sweating, dizziness, nausea/vomiting, disturbances to hearing, vision, smell or taste. No loss of consciousness occurs, and the person remains aware of his/her environment.   Complex partial seizures: Staring, motionless,  picking at clothes, smacking lips, swallowing repeatedly or wandering around. The person is not aware of their surroundings and is not fully responsive.  Atonic seizures: “Drop attacks” or sudden, rapid fall to ground with rapid recovery.   Myoclonic seizures: Brief forceful jerks which can affect the whole body or just part of it.   Absence seizures: May appear to be daydreaming or “spacing out.” The person is momentarily unresponsive and unaware of what is happening around him/her.   Tonic seizures: Stiffening of part or of the entire body.  Generalized Tonic-Clonic Seizures. “Grand-mal seizure.” Sudden loss of consciousness with body stiffening followed by continuous jerking movements. A blue tinge around the mouth is likely but lack of oxygen is rare. Loss of bladder and/or bowel control may occur.    SEIZURE SAFETY    Don’t let fear of seizures keep you at home. Be smart about your activities to make sure you are safe. The guidelines below can help you be as safe as possible.    Make sure the people around you are aware of:  What happens when you have a seizure  Correct seizure first aid  First aid for choking (consider taking a basic life support class)  When they should know to call 911 or for medical help    Avoid common triggers of seizures:  Missing your medications  Not getting enough sleep  Drinking alcohol  Using illegal drugs    Safety measures for at home:  In the bathroom:   Do not take baths in the bathtub. Instead, take only showers. Try to have a family member available while you are in the shower.  Make sure the drain in the bathtub/shower is working properly to avoid pooling of water.  You can consider a fitted shower seat. Recessed soap trays can minimize injury if you would happen to fall in the shower.   Bathroom doors can be hung to open outwards, so that the door can still be opened if you fall against the door.   Do not lock the bathroom door. Use an “Occupied” sign on the door or other  signal to let others know you are in the bathroom.  Safety locks can be obtained from the [employer].  On your water heater, set your water temperature to a warm temperature that is not scalding to avoid burns from very hot water.   Put non-skid strips/ in the bathtub.  Use an electric shaver.  Avoid any electrical appliances (including electric shaver) in the bathroom or near water.  Use shatterproof glass for mirrors.    In the kitchen:   When possible, cook using a microwave.  Only cook or use electrical appliances when someone else is in the house and available.   As much as possible, grill food and avoid fryers or frying food.  Use the back burners of the stove and turn the pot handles toward the back of the stove.  Avoid carrying hot pans, pots of boiling water, or very hot food. Serve food or liquids directly from the stove. At the least, minimize the distance hot food is carried.   Use precut foods or food processers to limit the need to use knives.   Use plastic or durable cups, dishes, and containers instead of breakable glass items.    In the bedroom  Low level and wide beds (like a futon) can reduce risk of injury of falling out of bed. If there is a high risk of falling out of bed, you can consider simply putting the mattress on the floor.  Avoid sleeping on top bunks of bunk beds.   Place a soft carpet on the floor.    Around the house  Pad sharp corners of tables, chairs, etc. Round tables and furniture can be considered to avoid sharp corners.   Avoid open flames. Place a screen in front of fireplaces and don’t build a fire alone.   Allow for open spaces with furniture.  Avoid loose throw rugs or slippery floors. Non-slip radha or cushioned radha can help reduce injury from a fall.   Fireproof fabrics and furniture are best, and especially important if you smoke.  Doors and windows with safety glass are safer if someone falls against them.  Avoid lights and heaters that  could easily be knocked over.  Use curling irons or clothing irons that have automatic shut off switches.  Make sure motor driven equipment or lawn mowers have “dead man’s” handles or seats so they will turn off if you release pressure.    Safety measures when away from home  Driving  [place] law mandates that you cannot drive for 6 months after your most recent seizure.   [place] law mandates that you cannot drive for 6 months after your most recent seizure.    Work/Travel  Wear a medical alert bracelet or necklace at all times.  Wear a helmet and use protective clothing/equipment when appropriate.  Consider telling co-workers and travel companions that you have epilepsy and what to do if you have a seizure.  Avoid irregular shifts or disruptions of a regular sleep pattern.  Take your medications on time and keep an updated list of medications in your purse or wallet.  Do not climb to heights or operate heavy machinery.  Stand back from the edges of roads or bus/train platforms when traveling.  If you wander when you have a seizure, take a friend along for the trip.    Recreation  Swimming can be dangerous. Do not swim alone, in open water, or in murky water that you cannot see the bottom.   Caregivers should be with you in the pool at all times and must be physically able to get you out of the water.  Use a flotation device.   Scuba diving is not recommended since during a seizure people are unaware of their surroundings.  Having a seizure underwater can be deadly and can endanger the lives of others.  Kayaking and canoeing can be especially dangerous  People with epilepsy are at a higher risk of becoming trapped underneath a canoe or kayak.  Wear a helmet when playing contact sports, biking, or if there is a risk of falling.  Patients with epilepsy are at a higher risk of head injury when playing contact sports.  Avoid riding a bike, running, or other activities around busy roads, steep hills, or secluded  areas.   Exercise on soft surfaces.  Theme Doyle: many people with epilepsy can go on rides depending on their type of seizures.  For some people, stress or excitement can trigger a seizure.   Rollercoasters (especially if you are upside-down) are more dangerous for people with epilepsy.      Medications  Take your medications on time. If you have trouble remembering, set alarms on your phone. You can visit [email or web address] to set up reminders through text message to help you remember to take your medications.  Use a pillbox to help you keep track of your medications.  When out of the house, take any needed medications with you. Consider keeping one or two extra doses with you in case you are unexpectedly away from home longer than planned.  If you realize you missed a dose of your medications and it is less than 2 hours until your next dose, skip the missed dose. Do not double up your medication dose. If it is more than 2 hours until your next dose, you can take the missed dose.   Avoid drinking alcohol, since this can enhance effects of your seizure medications.  Be aware of common and major side effects of your medications.  Keep your medications out of the reach of children.    Parenting:  Childproof your home as much as possible.  It is possible that you could drop your baby if you have a seizure while holding or feeding them.  If you are nursing a baby, sit on the floor or bed with your back supported so the baby will not fall far if you should lose consciousness.  Feed the baby while he or she is seated in an infant seat.  Dress, change, and sponge bathe the baby on the floor.  Move the baby around in a stroller or small crib.  Keep a young baby in a playpen when you are alone, and a toddler in an indoor play yard, or childproof one room and use safety benitez at the doors.  When out of the house, use a bungee-type cord or restraint harness so your child cannot wander away if you have a seizure that  affects your awareness.

## 2024-05-22 ENCOUNTER — ANESTHESIA EVENT (OUTPATIENT)
Dept: ANESTHESIOLOGY | Facility: HOSPITAL | Age: 69
End: 2024-05-22

## 2024-05-22 ENCOUNTER — ANESTHESIA (OUTPATIENT)
Dept: ANESTHESIOLOGY | Facility: HOSPITAL | Age: 69
End: 2024-05-22

## 2024-06-05 ENCOUNTER — ANESTHESIA (OUTPATIENT)
Dept: GASTROENTEROLOGY | Facility: AMBULARY SURGERY CENTER | Age: 69
End: 2024-06-05

## 2024-06-05 ENCOUNTER — ANESTHESIA EVENT (OUTPATIENT)
Dept: GASTROENTEROLOGY | Facility: AMBULARY SURGERY CENTER | Age: 69
End: 2024-06-05

## 2024-06-05 ENCOUNTER — HOSPITAL ENCOUNTER (OUTPATIENT)
Dept: GASTROENTEROLOGY | Facility: AMBULARY SURGERY CENTER | Age: 69
Setting detail: OUTPATIENT SURGERY
Discharge: HOME | End: 2024-06-05
Attending: INTERNAL MEDICINE | Admitting: INTERNAL MEDICINE
Payer: COMMERCIAL

## 2024-06-05 VITALS
TEMPERATURE: 96.9 F | WEIGHT: 207 LBS | HEIGHT: 73 IN | BODY MASS INDEX: 27.43 KG/M2 | RESPIRATION RATE: 16 BRPM | SYSTOLIC BLOOD PRESSURE: 123 MMHG | OXYGEN SATURATION: 97 % | DIASTOLIC BLOOD PRESSURE: 81 MMHG | HEART RATE: 66 BPM

## 2024-06-05 DIAGNOSIS — R10.13 DYSPEPSIA: ICD-10-CM

## 2024-06-05 PROCEDURE — 88305 TISSUE EXAM BY PATHOLOGIST: CPT | Performed by: PATHOLOGY

## 2024-06-05 PROCEDURE — 43239 EGD BIOPSY SINGLE/MULTIPLE: CPT | Performed by: INTERNAL MEDICINE

## 2024-06-05 RX ORDER — SODIUM CHLORIDE, SODIUM LACTATE, POTASSIUM CHLORIDE, CALCIUM CHLORIDE 600; 310; 30; 20 MG/100ML; MG/100ML; MG/100ML; MG/100ML
125 INJECTION, SOLUTION INTRAVENOUS CONTINUOUS
Status: DISCONTINUED | OUTPATIENT
Start: 2024-06-05 | End: 2024-06-09 | Stop reason: HOSPADM

## 2024-06-05 RX ORDER — LIDOCAINE HYDROCHLORIDE 20 MG/ML
INJECTION, SOLUTION EPIDURAL; INFILTRATION; INTRACAUDAL; PERINEURAL AS NEEDED
Status: DISCONTINUED | OUTPATIENT
Start: 2024-06-05 | End: 2024-06-05

## 2024-06-05 RX ORDER — SODIUM CHLORIDE, SODIUM LACTATE, POTASSIUM CHLORIDE, CALCIUM CHLORIDE 600; 310; 30; 20 MG/100ML; MG/100ML; MG/100ML; MG/100ML
INJECTION, SOLUTION INTRAVENOUS CONTINUOUS PRN
Status: DISCONTINUED | OUTPATIENT
Start: 2024-06-05 | End: 2024-06-05

## 2024-06-05 RX ORDER — PROPOFOL 10 MG/ML
INJECTION, EMULSION INTRAVENOUS AS NEEDED
Status: DISCONTINUED | OUTPATIENT
Start: 2024-06-05 | End: 2024-06-05

## 2024-06-05 RX ADMIN — SODIUM CHLORIDE, SODIUM LACTATE, POTASSIUM CHLORIDE, AND CALCIUM CHLORIDE 125 ML/HR: .6; .31; .03; .02 INJECTION, SOLUTION INTRAVENOUS at 10:49

## 2024-06-05 RX ADMIN — PROPOFOL 30 MG: 10 INJECTION, EMULSION INTRAVENOUS at 11:13

## 2024-06-05 RX ADMIN — PROPOFOL 50 MG: 10 INJECTION, EMULSION INTRAVENOUS at 11:10

## 2024-06-05 RX ADMIN — SODIUM CHLORIDE, SODIUM LACTATE, POTASSIUM CHLORIDE, AND CALCIUM CHLORIDE: .6; .31; .03; .02 INJECTION, SOLUTION INTRAVENOUS at 11:03

## 2024-06-05 RX ADMIN — PROPOFOL 100 MG: 10 INJECTION, EMULSION INTRAVENOUS at 11:07

## 2024-06-05 RX ADMIN — LIDOCAINE HYDROCHLORIDE 50 MG: 20 INJECTION, SOLUTION EPIDURAL; INFILTRATION; INTRACAUDAL; PERINEURAL at 11:07

## 2024-06-05 RX ADMIN — PROPOFOL 20 MG: 10 INJECTION, EMULSION INTRAVENOUS at 11:15

## 2024-06-05 NOTE — H&P
"History and Physical -  [facility]  Rachid Linares 69 y.o. male MRN: [id number]    HPI: Rachid Linares is a 69 y.o. year old male who presents for evaluation of dyspepsia symptoms.      Review of Systems    Historical Information   Past Medical History:   Diagnosis Date    Head injury      Past Surgical History:   Procedure Laterality Date    ROTATOR CUFF REPAIR Bilateral     TONSILLECTOMY       Social History   Social History     Substance and Sexual Activity   Alcohol Use Not Currently     Social History     Substance and Sexual Activity   Drug Use Never     Social History     Tobacco Use   Smoking Status Former   Smokeless Tobacco Never   Tobacco Comments    quit around 2005     Family History   Problem Relation Age of Onset    Diabetes Father        Meds/Allergies     Not in a hospital admission.    No Known Allergies    Objective     /66   Pulse 76   Temp (!) 96.9 °F (36.1 °C) (Temporal)   Resp 20   Ht 6' 1\" (1.854 m)   Wt 93.9 kg (207 lb)   SpO2 98%   BMI 27.31 kg/m²       PHYSICAL EXAM    Gen: NAD  CV: RRR  CHEST: Clear  ABD: soft, NT/ND  EXT: no edema  Neuro: AAO      ASSESSMENT/PLAN:  This is a 69 y.o. year old male here for evaluation of dyspepsia symptoms.    PLAN:   Procedure: EGD.      "

## 2024-06-05 NOTE — ANESTHESIA PREPROCEDURE EVALUATION
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         Additional Monitors:     Airway Plan:            Plan Factors-Exercise tolerance (METS): >4 METS.    Chart reviewed.   Existing labs reviewed. Patient summary reviewed.    Patient is not a current smoker.              Induction- intravenous.    Postoperative Plan-         Informed Consent- Anesthetic plan and risks discussed with patient.  I personally reviewed this patient with the CRNA. Discussed and agreed on the Anesthesia Plan with the CRNA..

## 2024-06-05 NOTE — ANESTHESIA POSTPROCEDURE EVALUATION
Post-Op Assessment Note    CV Status:  Stable  Pain Score: 0    Pain management: adequate       Mental Status:  Sleepy and arousable   Hydration Status:  Euvolemic   PONV Controlled:  Controlled   Airway Patency:  Patent and adequate     Post Op Vitals Reviewed: Yes    No anethesia notable event occurred.    Staff: Anesthesiologist, CRNA               /68 (06/05/24 1118)    Temp      Pulse 74 (06/05/24 1118)   Resp 16 (06/05/24 1118)    SpO2 94 % (06/05/24 1118)

## 2024-06-10 PROCEDURE — 88305 TISSUE EXAM BY PATHOLOGIST: CPT | Performed by: PATHOLOGY

## 2024-06-12 ENCOUNTER — TELEPHONE (OUTPATIENT)
Dept: GASTROENTEROLOGY | Facility: AMBULARY SURGERY CENTER | Age: 69
End: 2024-06-12

## 2024-06-12 NOTE — TELEPHONE ENCOUNTER
Attempted to call patient with EGD results and recommendations, however, I received voicemail. Advised to return call to office to discuss.     1 year EGD recall set.    Please, relay results if patient returns call. Thank you!

## 2024-06-12 NOTE — TELEPHONE ENCOUNTER
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

## 2024-06-14 ENCOUNTER — TELEPHONE (OUTPATIENT)
Dept: GASTROENTEROLOGY | Facility: AMBULARY SURGERY CENTER | Age: 69
End: 2024-06-14

## 2024-06-14 NOTE — TELEPHONE ENCOUNTER
Attempted to call patient with EGD results and recommendations, however, I received voicemail. Advised to return call to office to discuss.     Letter sent. 1 year EGD recall set.    Please, relay results if patient returns call. Thank you!

## 2024-06-14 NOTE — TELEPHONE ENCOUNTER
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

## 2024-07-19 NOTE — ED NOTES
Wife given green towel and yellow band with white stones (ring) to take home at wife's request.     Aundrea Rivera RN  07/19/24 6831

## 2024-07-19 NOTE — ED PROVIDER NOTES
History  Chief Complaint   Patient presents with    Cardiac Arrest     Pt brought in CPR in progress     Pt is 70 y/o male with a hx of hypothyroidism, HTN, hyperlipidemia that presents as a CPR in progress. Pt allegedly collapsed at home, CPR was delayed but started after approx 5mins. Shock advised by BLS - shock x 1. ALS arrived. Shocks x 3 en route, 300mg amiodarone, epi x 5 administered via an IO, asystole on arrival. Pt intubated with an 8.0 ETT.  CPR continued in the ED, without ROSC -patient remained asystole.  Patient pronounced at 10:09 AM.      History provided by:  EMS personnel  History limited by:  Acuity of condition   used: No    Cardiac Arrest      Prior to Admission Medications   Prescriptions Last Dose Informant Patient Reported? Taking?   Calcium Carbonate-Vitamin D 600-400 MG-UNIT per tablet  Self Yes No   Sig: Take 1 tablet by mouth 2 (two) times a day   Cholecalciferol (VITAMIN D-3 PO)   Yes No   Sig: Take by mouth   Multiple Vitamin (multivitamin) tablet  Self Yes No   Sig: Take 1 tablet by mouth daily   amLODIPine (NORVASC) 5 mg tablet  Self Yes No   Sig: Take 5 mg by mouth in the morning.   levETIRAcetam (Keppra) 1000 MG tablet   No No   Sig: Take 2 tablets (2,000 mg total) by mouth 2 (two) times a day   levothyroxine 175 mcg tablet  Self Yes No   Sig: Take 175 mcg by mouth every morning   lisinopril (ZESTRIL) 20 mg tablet  Self Yes No   Sig: Take 20 mg by mouth daily   pantoprazole (PROTONIX) 40 mg tablet  Self Yes No   Sig: Take 40 mg by mouth daily   rosuvastatin (CRESTOR) 20 MG tablet  Self Yes No   Sig: Take 20 mg by mouth in the morning.      Facility-Administered Medications: None       Past Medical History:   Diagnosis Date    Head injury        Past Surgical History:   Procedure Laterality Date    ROTATOR CUFF REPAIR Bilateral     TONSILLECTOMY         Family History   Problem Relation Age of Onset    Diabetes Father      I have reviewed and agree with the  history as documented.    E-Cigarette/Vaping    E-Cigarette Use Never User      E-Cigarette/Vaping Substances    Nicotine No     THC No     CBD No     Flavoring No     Other No     Unknown No      Social History     Tobacco Use    Smoking status: Former    Smokeless tobacco: Never    Tobacco comments:     quit around 2005   Vaping Use    Vaping status: Never Used   Substance Use Topics    Alcohol use: Not Currently    Drug use: Never       Review of Systems   Unable to perform ROS: Acuity of condition       Physical Exam  Physical Exam  Vitals and nursing note reviewed.   Constitutional:       Appearance: He is toxic-appearing.      Interventions: He is intubated.   HENT:      Head: Normocephalic and atraumatic.   Eyes:      Comments: Pupils fixed and dilated   Cardiovascular:      Comments: Chest compressions - tachycardic  Pulmonary:      Effort: He is intubated.      Comments: Pt being bagged by BVM  Abdominal:      Palpations: There is no mass.      Hernia: No hernia is present.   Musculoskeletal:         General: No swelling or deformity.   Skin:     Capillary Refill: Capillary refill takes more than 3 seconds.      Coloration: Skin is cyanotic, mottled and pale.         Vital Signs  ED Triage Vitals   Temp Pulse Respirations Blood Pressure SpO2   -- 07/19/24 0957 07/19/24 0957 07/19/24 1000 07/19/24 0957    (!) 193 (!) 102 154/82 (!) 63 %      Temp src Heart Rate Source Patient Position - Orthostatic VS BP Location FiO2 (%)   -- -- -- -- --             Pain Score       --                  Vitals:    07/19/24 0957 07/19/24 1000 07/19/24 1003 07/19/24 1006   BP:  154/82     Pulse: (!) 193 (!) 207 (!) 150 (!) 0         Visual Acuity      ED Medications  Medications   EPINEPHrine (ADRENALIN) injection (1 mg Intravenous Given 7/19/24 1006)   sodium chloride 0.9 % bolus (0 mL Intravenous Stopped 7/19/24 1000)   calcium chloride 1 g/10 mL injection (1 g Intravenous Given 7/19/24 1002)   naloxone (NARCAN) 2 mg/2 mL  injection (2 mg Intravenous Given 7/19/24 1004)   sodium bicarbonate 50 mEq/50 mL injection (100 mEq Intravenous Given 7/19/24 0958)       Diagnostic Studies  Results Reviewed       Procedure Component Value Units Date/Time    Fingerstick Glucose (POCT) [[id number]]  (Abnormal) Collected: 07/19/24 1000    Lab Status: Final result Specimen: Blood Updated: 07/19/24 1001     POC Glucose 154 mg/dl                    No orders to display              Procedures  CriticalCare Time    Date/Time: 7/19/2024 10:00 AM    Performed by: Alexx Wilhelm DO  Authorized by: Alexx Wilhelm DO    Critical care provider statement:     Critical care time (minutes):  35    Critical care time was exclusive of:  Separately billable procedures and treating other patients and teaching time    Critical care was necessary to treat or prevent imminent or life-threatening deterioration of the following conditions:  Cardiac failure    Critical care was time spent personally by me on the following activities:  Development of treatment plan with patient or surrogate, evaluation of patient's response to treatment, examination of patient, ordering and performing treatments and interventions, re-evaluation of patient's condition and review of old charts    I assumed direction of critical care for this patient from another provider in my specialty: no    Central Line    Date/Time: 7/19/2024 10:05 AM    Performed by: Alexx Wilhelm DO  Authorized by: Alexx Wilhelm DO    Patient location:  ED  Other Assisting Provider: No    Consent:     Consent obtained:  Emergent situation    Alternatives discussed:  No treatment  Universal protocol:     Site/side marked: yes      Immediately prior to procedure, a time out was called: yes      Patient identity confirmed:  Arm band  Pre-procedure details:     Hand hygiene: Hand hygiene performed prior to insertion      Sterile barrier technique: All elements of maximal sterile technique  followed      Skin preparation:  Hibiclens    Skin preparation agent: Skin preparation agent completely dried prior to procedure    Indications:     Central line indications: medications requiring central line      Site selection rationale:  Accessibility  Anesthesia (see MAR for exact dosages):     Anesthesia method:  None  Procedure details:     Location:  Right femoral    Vessel type: vein      Laterality:  Right    Approach: percutaneous technique used      Patient position:  Flat    Catheter type:  Triple lumen 20cm    Catheter size:  7 Fr    Landmarks identified: yes      Ultrasound guidance: no      Manometry confirmation: no      Number of attempts:  2    Successful placement: yes      Catheter tip vessel location: inferior vena cava    Post-procedure details:     Post-procedure:  Dressing applied    Assessment:  Blood return through all ports    Post-procedure complications: none      Patient tolerance of procedure:  Tolerated well, no immediate complications           ED Course  ED Course as of 07/19/24 1557   Fri Jul 19, 2024   1206 Pt's wife states that he mowed the lawn this morning, after which he packed up a ramp used by his paraplegic son who left for Mouth Party this morning. Afterwards, pt noted that he was hot and needed a shower. His wife heard a thud shortly afterwards and found him unresponsive                                 SBIRT 20yo+      Flowsheet Row Most Recent Value   Initial Alcohol Screen: US AUDIT-C     1. How often do you have a drink containing alcohol? 0 Filed at: 07/19/2024 0956   2. How many drinks containing alcohol do you have on a typical day you are drinking?  0 Filed at: 07/19/2024 0956   3a. Male UNDER 65: How often do you have five or more drinks on one occasion? 0 Filed at: 07/19/2024 0956   3b. FEMALE Any Age, or MALE 65+: How often do you have 4 or more drinks on one occassion? 0 Filed at: 07/19/2024 0956   Audit-C Score 0 Filed at: 07/19/2024 0956   DEIDRA: How many times in  the past year have you...    Used an illegal drug or used a prescription medication for non-medical reasons? Never Filed at: 2024 0956                      Medical Decision Making  69-year-old male in the ED with CPR in progress.  Patient arrived in asystole, and despite multiple rounds of CPR, did not achieve ROSC.  See code narrator for detailed information.  Patient was pronounced at 10:09 AM.    Risk  Prescription drug management.                 Disposition  Final diagnoses:   Cardiac arrest (HCC)     Time reflects when diagnosis was documented in both MDM as applicable and the Disposition within this note       Time User Action Codes Description Comment    2024 11:41 AM Alexx Wilhelm [I46.9] Cardiac arrest (HCC)           ED Disposition       ED Disposition       Condition   --    Date/Time    1219    Comment   --             Follow-up Information    None       Date, Time and Cause of Death    Date of Death: 24  Time of Death: 1009  Preliminary Cause of Death: Cardiac arrest       Discharge Medication List as of 2024  1:50 PM        CONTINUE these medications which have NOT CHANGED    Details   amLODIPine (NORVASC) 5 mg tablet Take 5 mg by mouth in the morning., Historical Med      Calcium Carbonate-Vitamin D 600-400 MG-UNIT per tablet Take 1 tablet by mouth 2 (two) times a day, Historical Med      Cholecalciferol (VITAMIN D-3 PO) Take by mouth, Historical Med      levETIRAcetam (Keppra) 1000 MG tablet Take 2 tablets (2,000 mg total) by mouth 2 (two) times a day, Starting 2024, Normal      levothyroxine 175 mcg tablet Take 175 mcg by mouth every morning, Starting Mon 4/10/2023, Historical Med      lisinopril (ZESTRIL) 20 mg tablet Take 20 mg by mouth daily, Historical Med      Multiple Vitamin (multivitamin) tablet Take 1 tablet by mouth daily, Historical Med      pantoprazole (PROTONIX) 40 mg tablet Take 40 mg by mouth daily, Starting Tue  4/23/2024, Historical Med      rosuvastatin (CRESTOR) 20 MG tablet Take 20 mg by mouth in the morning., Historical Med             No discharge procedures on file.    PDMP Review         Value Time User    PDMP Reviewed  Yes 7/25/2022 11:43 AM Chad Dye MD            ED Provider  Electronically Signed by             Alexx Wilhelm DO  07/19/24 9482